# Patient Record
Sex: MALE | Race: OTHER | URBAN - METROPOLITAN AREA
[De-identification: names, ages, dates, MRNs, and addresses within clinical notes are randomized per-mention and may not be internally consistent; named-entity substitution may affect disease eponyms.]

---

## 2020-10-05 ENCOUNTER — INPATIENT (INPATIENT)
Facility: HOSPITAL | Age: 55
LOS: 1 days | Discharge: HOME | End: 2020-10-07
Attending: INTERNAL MEDICINE | Admitting: INTERNAL MEDICINE
Payer: MEDICAID

## 2020-10-05 VITALS
TEMPERATURE: 98 F | HEART RATE: 103 BPM | DIASTOLIC BLOOD PRESSURE: 65 MMHG | WEIGHT: 216.05 LBS | SYSTOLIC BLOOD PRESSURE: 100 MMHG | HEIGHT: 68 IN | OXYGEN SATURATION: 100 % | RESPIRATION RATE: 19 BRPM

## 2020-10-05 DIAGNOSIS — R56.9 UNSPECIFIED CONVULSIONS: ICD-10-CM

## 2020-10-05 DIAGNOSIS — R79.89 OTHER SPECIFIED ABNORMAL FINDINGS OF BLOOD CHEMISTRY: ICD-10-CM

## 2020-10-05 LAB
ALBUMIN SERPL ELPH-MCNC: 4.3 G/DL — SIGNIFICANT CHANGE UP (ref 3.3–5)
ALP SERPL-CCNC: 108 U/L — SIGNIFICANT CHANGE UP (ref 40–120)
ALT FLD-CCNC: 46 U/L — SIGNIFICANT CHANGE UP (ref 12–78)
ANION GAP SERPL CALC-SCNC: 15 MMOL/L — SIGNIFICANT CHANGE UP (ref 5–17)
APAP SERPL-MCNC: < 2 UG/ML (ref 10–30)
APPEARANCE UR: CLEAR — SIGNIFICANT CHANGE UP
APTT BLD: 26.3 SEC — LOW (ref 27.5–35.5)
AST SERPL-CCNC: 45 U/L — HIGH (ref 15–37)
BASOPHILS # BLD AUTO: 0.03 K/UL — SIGNIFICANT CHANGE UP (ref 0–0.2)
BASOPHILS NFR BLD AUTO: 0.5 % — SIGNIFICANT CHANGE UP (ref 0–2)
BILIRUB SERPL-MCNC: 0.8 MG/DL — SIGNIFICANT CHANGE UP (ref 0.2–1.2)
BILIRUB UR-MCNC: NEGATIVE — SIGNIFICANT CHANGE UP
BUN SERPL-MCNC: 13 MG/DL — SIGNIFICANT CHANGE UP (ref 7–23)
CALCIUM SERPL-MCNC: 8.5 MG/DL — SIGNIFICANT CHANGE UP (ref 8.5–10.1)
CHLORIDE SERPL-SCNC: 107 MMOL/L — SIGNIFICANT CHANGE UP (ref 96–108)
CO2 SERPL-SCNC: 17 MMOL/L — LOW (ref 22–31)
COLOR SPEC: YELLOW — SIGNIFICANT CHANGE UP
COMMENT - URINE: SIGNIFICANT CHANGE UP
CREAT SERPL-MCNC: 1.32 MG/DL — HIGH (ref 0.5–1.3)
DIFF PNL FLD: NEGATIVE — SIGNIFICANT CHANGE UP
EOSINOPHIL # BLD AUTO: 0.13 K/UL — SIGNIFICANT CHANGE UP (ref 0–0.5)
EOSINOPHIL NFR BLD AUTO: 2.1 % — SIGNIFICANT CHANGE UP (ref 0–6)
ETHANOL SERPL-MCNC: <10 MG/DL — SIGNIFICANT CHANGE UP (ref 0–10)
GLUCOSE BLDC GLUCOMTR-MCNC: 117 MG/DL — HIGH (ref 70–99)
GLUCOSE SERPL-MCNC: 116 MG/DL — HIGH (ref 70–99)
GLUCOSE UR QL: NEGATIVE MG/DL — SIGNIFICANT CHANGE UP
GRAN CASTS # UR COMP ASSIST: ABNORMAL /LPF
HCT VFR BLD CALC: 44.8 % — SIGNIFICANT CHANGE UP (ref 39–50)
HGB BLD-MCNC: 15.3 G/DL — SIGNIFICANT CHANGE UP (ref 13–17)
IMM GRANULOCYTES NFR BLD AUTO: 0.3 % — SIGNIFICANT CHANGE UP (ref 0–1.5)
INR BLD: 1.05 RATIO — SIGNIFICANT CHANGE UP (ref 0.88–1.16)
KETONES UR-MCNC: NEGATIVE — SIGNIFICANT CHANGE UP
LACTATE SERPL-SCNC: 1.6 MMOL/L — SIGNIFICANT CHANGE UP (ref 0.7–2)
LACTATE SERPL-SCNC: 10.4 MMOL/L — CRITICAL HIGH (ref 0.7–2)
LEUKOCYTE ESTERASE UR-ACNC: NEGATIVE — SIGNIFICANT CHANGE UP
LYMPHOCYTES # BLD AUTO: 1.81 K/UL — SIGNIFICANT CHANGE UP (ref 1–3.3)
LYMPHOCYTES # BLD AUTO: 29.9 % — SIGNIFICANT CHANGE UP (ref 13–44)
MCHC RBC-ENTMCNC: 31.1 PG — SIGNIFICANT CHANGE UP (ref 27–34)
MCHC RBC-ENTMCNC: 34.2 GM/DL — SIGNIFICANT CHANGE UP (ref 32–36)
MCV RBC AUTO: 91.1 FL — SIGNIFICANT CHANGE UP (ref 80–100)
MONOCYTES # BLD AUTO: 0.44 K/UL — SIGNIFICANT CHANGE UP (ref 0–0.9)
MONOCYTES NFR BLD AUTO: 7.3 % — SIGNIFICANT CHANGE UP (ref 2–14)
NEUTROPHILS # BLD AUTO: 3.62 K/UL — SIGNIFICANT CHANGE UP (ref 1.8–7.4)
NEUTROPHILS NFR BLD AUTO: 59.9 % — SIGNIFICANT CHANGE UP (ref 43–77)
NITRITE UR-MCNC: NEGATIVE — SIGNIFICANT CHANGE UP
NRBC # BLD: 0 /100 WBCS — SIGNIFICANT CHANGE UP (ref 0–0)
PH UR: 6 — SIGNIFICANT CHANGE UP (ref 5–8)
PLATELET # BLD AUTO: 255 K/UL — SIGNIFICANT CHANGE UP (ref 150–400)
POTASSIUM SERPL-MCNC: 3.8 MMOL/L — SIGNIFICANT CHANGE UP (ref 3.5–5.3)
POTASSIUM SERPL-SCNC: 3.8 MMOL/L — SIGNIFICANT CHANGE UP (ref 3.5–5.3)
PROT SERPL-MCNC: 7.8 GM/DL — SIGNIFICANT CHANGE UP (ref 6–8.3)
PROT UR-MCNC: 30 MG/DL
PROTHROM AB SERPL-ACNC: 12.2 SEC — SIGNIFICANT CHANGE UP (ref 10.6–13.6)
RBC # BLD: 4.92 M/UL — SIGNIFICANT CHANGE UP (ref 4.2–5.8)
RBC # FLD: 12.8 % — SIGNIFICANT CHANGE UP (ref 10.3–14.5)
RBC CASTS # UR COMP ASSIST: SIGNIFICANT CHANGE UP /HPF (ref 0–4)
SALICYLATES SERPL-MCNC: <1.7 MG/DL — LOW (ref 2.8–20)
SODIUM SERPL-SCNC: 139 MMOL/L — SIGNIFICANT CHANGE UP (ref 135–145)
SP GR SPEC: 1.01 — SIGNIFICANT CHANGE UP (ref 1.01–1.02)
TROPONIN I SERPL-MCNC: <.015 NG/ML — SIGNIFICANT CHANGE UP (ref 0.01–0.04)
UROBILINOGEN FLD QL: NEGATIVE MG/DL — SIGNIFICANT CHANGE UP
WBC # BLD: 6.05 K/UL — SIGNIFICANT CHANGE UP (ref 3.8–10.5)
WBC # FLD AUTO: 6.05 K/UL — SIGNIFICANT CHANGE UP (ref 3.8–10.5)
WBC UR QL: SIGNIFICANT CHANGE UP

## 2020-10-05 PROCEDURE — 70450 CT HEAD/BRAIN W/O DYE: CPT | Mod: 26

## 2020-10-05 PROCEDURE — 70496 CT ANGIOGRAPHY HEAD: CPT | Mod: 26

## 2020-10-05 PROCEDURE — 71045 X-RAY EXAM CHEST 1 VIEW: CPT | Mod: 26

## 2020-10-05 PROCEDURE — 99223 1ST HOSP IP/OBS HIGH 75: CPT

## 2020-10-05 PROCEDURE — 70498 CT ANGIOGRAPHY NECK: CPT | Mod: 26

## 2020-10-05 PROCEDURE — 93010 ELECTROCARDIOGRAM REPORT: CPT

## 2020-10-05 PROCEDURE — 99285 EMERGENCY DEPT VISIT HI MDM: CPT

## 2020-10-05 RX ORDER — LEVETIRACETAM 250 MG/1
500 TABLET, FILM COATED ORAL
Refills: 0 | Status: DISCONTINUED | OUTPATIENT
Start: 2020-10-05 | End: 2020-10-07

## 2020-10-05 RX ORDER — LEVETIRACETAM 250 MG/1
1000 TABLET, FILM COATED ORAL ONCE
Refills: 0 | Status: COMPLETED | OUTPATIENT
Start: 2020-10-05 | End: 2020-10-05

## 2020-10-05 RX ORDER — SODIUM CHLORIDE 9 MG/ML
1000 INJECTION INTRAMUSCULAR; INTRAVENOUS; SUBCUTANEOUS ONCE
Refills: 0 | Status: COMPLETED | OUTPATIENT
Start: 2020-10-05 | End: 2020-10-05

## 2020-10-05 RX ORDER — ASPIRIN/CALCIUM CARB/MAGNESIUM 324 MG
81 TABLET ORAL DAILY
Refills: 0 | Status: DISCONTINUED | OUTPATIENT
Start: 2020-10-05 | End: 2020-10-07

## 2020-10-05 RX ADMIN — LEVETIRACETAM 500 MILLIGRAM(S): 250 TABLET, FILM COATED ORAL at 17:51

## 2020-10-05 RX ADMIN — LEVETIRACETAM 1000 MILLIGRAM(S): 250 TABLET, FILM COATED ORAL at 10:37

## 2020-10-05 RX ADMIN — SODIUM CHLORIDE 1000 MILLILITER(S): 9 INJECTION INTRAMUSCULAR; INTRAVENOUS; SUBCUTANEOUS at 09:20

## 2020-10-05 RX ADMIN — Medication 81 MILLIGRAM(S): at 16:56

## 2020-10-05 NOTE — ED ADULT NURSE NOTE - NSIMPLEMENTINTERV_GEN_ALL_ED
Implemented All Fall Risk Interventions:  [place] to call system. Call bell, personal items and telephone within reach. Instruct patient to call for assistance. Room bathroom lighting operational. Non-slip footwear when patient is off stretcher. Physically safe environment: no spills, clutter or unnecessary equipment. Stretcher in lowest position, wheels locked, appropriate side rails in place. Provide visual cue, wrist band, yellow gown, etc. Monitor gait and stability. Monitor for mental status changes and reorient to person, place, and time. Review medications for side effects contributing to fall risk. Reinforce activity limits and safety measures with patient and family.

## 2020-10-05 NOTE — CONSULT NOTE ADULT - ASSESSMENT
Breakthrough seizure likely secondary to old L MCA CVA  Right sided weakness resolved likely secondary to post ictal state- resolved  NIHSS-2  MRS 0 Breakthrough seizure likely secondary to old L MCA CVA  Right sided weakness resolved likely secondary to post ictal state- resolved  NIHSS-2  MRS 0    Plan  Load Keppra 2gm  Continue Keppra 500mg BID  EEG  medication reconciliation  ASA/Statin for stroke prevention for now  lipid, A1C   Seizure precautions  PT/OT/ST  Will continue to follow

## 2020-10-05 NOTE — ED PROVIDER NOTE - PROGRESS NOTE DETAILS
Radha: pt now waking up, appears post ictal, difficulty moving R side of body and difficulty speaking, suspect post-ictal c/s neurology for recs Radha: spoke with Dr. Dorsey (neurology) who recommends calling as code stroke. Symtpoms now resolving, alert and oriented to self and place, strength improved on R side.

## 2020-10-05 NOTE — ED PROVIDER NOTE - PHYSICAL EXAMINATION
VITALS: reviewed  GEN: NAD  HEAD/EYES: NCAT, PERRL, R gaze preference, EOMI, anicteric sclerae, no conjunctival pallor  ENT: mucus membranes moist, oropharynx WNL, trachea midline, no JVD  RESP: lungs CTA with equal breath sounds bilaterally,  CV: heart with reg rhythm S1, S2, no murmur; distal pulses intact and symmetric bilaterally  ABDOMEN: , soft, nondistended,   MSK: extremities atraumatic and nontender, no edema, no asymmetry.  SKIN: warm, dry, no rash, no bruising, no cyanosis. color appropriate for ethnicity  NEURO: no facial asymmetry. sedated  PSYCH: Affect appropriate

## 2020-10-05 NOTE — CONSULT NOTE ADULT - NUTRITIONAL ASSESSMENT
Load Keppra 2gm  Continue Keppra 500mg BID  EEG  medication reconciliation  ASA/Statin for stroke prevention for now  lipid, A1C   Seizure precautions  PT/OT/ST  Will continue to follow

## 2020-10-05 NOTE — ED ADULT TRIAGE NOTE - CHIEF COMPLAINT QUOTE
patient BIBA , patient sedated, as per EMS patient had a seizure got Versed 10 mg IM , patient directed to bed 1 Md and nurses by bed side

## 2020-10-05 NOTE — H&P ADULT - ASSESSMENT
55 yold male with history of seizures had a seizure on kepra here       IMPROVE VTE Individual Risk Assessment    RISK                                                          Points  [] Previous VTE                                           3  [] Thrombophilia                                        2  [] Lower limb paralysis                              2   [] Current Cancer                                       2   [] Immobilization > 24 hrs                        1  [] ICU/CCU stay > 24 hours                       1  [] Age > 60                                                   1    IMPROVE VTE Score:0

## 2020-10-05 NOTE — H&P ADULT - HISTORY OF PRESENT ILLNESS
: 54 yo M hx DM2, seizures, BIBA s/p witnessed seizure at work. 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 Per EMS, episode lasted about 10 minutes with eyes noted to be deviated to R. Patient last seizure was three months ago and he had his first seizure two years ago

## 2020-10-05 NOTE — ED PROVIDER NOTE - OBJECTIVE STATEMENT
56 yo M hx DM2, seizures, BIBA s/p witnessed seizure at work. 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 Per EMS, episode lasted about 10 minutes with eyes noted to be deviated to R. Unknown when last seizure was or what meds he was on. Pt was given 10 mg IM Versed on field.

## 2020-10-05 NOTE — ED ADULT NURSE NOTE - OBJECTIVE STATEMENT
Pt received in post ictal state w R 18g . Pt brought in by EMS after having a 10 min witnessed seizure at work. Pt given 10mg versed in route to ED. Pt has a history of seizure.

## 2020-10-05 NOTE — CONSULT NOTE ADULT - SUBJECTIVE AND OBJECTIVE BOX
56 yo M hx DM2, Old L MCA CVA BIBA s/p witnessed seizure at work. 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 Per EMS, episode lasted about 10 minutes with eyes noted to be deviated to R. Unknown when last seizure was or what meds he was on. Pt was given 10 mg IM Versed on field. Pt continued to have right sided weakness and stroke code was called but improved. CT/CTA done showing no acute findings.     Pt still a little confused but denied any past seizure history and seizure medications.

## 2020-10-05 NOTE — H&P ADULT - NSHPPHYSICALEXAM_GEN_ALL_CORE
ICU Vital Signs Last 24 Hrs  T(C): 36.5 (05 Oct 2020 11:44), Max: 37.3 (05 Oct 2020 09:15)  T(F): 97.7 (05 Oct 2020 11:44), Max: 99.2 (05 Oct 2020 09:15)  HR: 85 (05 Oct 2020 11:44) (85 - 103)  BP: 124/77 (05 Oct 2020 11:44) (100/65 - 137/87)  BP(mean): --  ABP: --  ABP(mean): --  RR: 16 (05 Oct 2020 11:44) (14 - 23)  SpO2: 98% (05 Oct 2020 11:44) (91% - 100%)  GENERAL: NAD well-developed  HEAD:  Atraumatic, Normocephalic  EYES: EOMI, PERRLA, conjunctiva and sclera clear  ENMT: No tonsillar erythema, exudates, or enlargement; Moist mucous membranes, Good dentition, No lesions  NECK: Supple, No JVD, Normal thyroid  NERVOUS SYSTEM:  Alert & Oriented X3, Good concentration; Motor Strength 5/5 B/L upper and lower extremities; DTRs 2+ intact and symmetric  CHEST/LUNG: Clear to percussion bilaterally; No rales, rhonchi, wheezing, or rubs  HEART: Regular rate and rhythm; No murmurs, rubs, or gallops  ABDOMEN: Soft, Nontender, Nondistended; Bowel sounds present  EXTREMITIES:  2+ Peripheral Pulses, No clubbing, cyanosis, or edema  LYMPH: No lymphadenopathy   SKIN: No rashes or lesions

## 2020-10-05 NOTE — CONSULT NOTE ADULT - NSREFPHYEXINPTDOCREFER_GEN_ALL_CORE
AAOX2-3. CN2-12 intact. right spasticity in upper and lower extremity. otherwise BOB. sensation intact b/l to LT. reflexes +3 on right. gait deferred

## 2020-10-05 NOTE — ED PROVIDER NOTE - CLINICAL SUMMARY MEDICAL DECISION MAKING FREE TEXT BOX
56 yo M presenting s/p witnessed seizure episode, post-ictal, will obtain CT-H, labs, EKG, neuro c/s, tba

## 2020-10-06 LAB
ANION GAP SERPL CALC-SCNC: 4 MMOL/L — LOW (ref 5–17)
BUN SERPL-MCNC: 12 MG/DL — SIGNIFICANT CHANGE UP (ref 7–23)
CALCIUM SERPL-MCNC: 8.5 MG/DL — SIGNIFICANT CHANGE UP (ref 8.5–10.1)
CHLORIDE SERPL-SCNC: 108 MMOL/L — SIGNIFICANT CHANGE UP (ref 96–108)
CHOLEST SERPL-MCNC: 107 MG/DL — SIGNIFICANT CHANGE UP (ref 10–199)
CO2 SERPL-SCNC: 27 MMOL/L — SIGNIFICANT CHANGE UP (ref 22–31)
CREAT SERPL-MCNC: 0.7 MG/DL — SIGNIFICANT CHANGE UP (ref 0.5–1.3)
CULTURE RESULTS: SIGNIFICANT CHANGE UP
GLUCOSE BLDC GLUCOMTR-MCNC: 119 MG/DL — HIGH (ref 70–99)
GLUCOSE BLDC GLUCOMTR-MCNC: 136 MG/DL — HIGH (ref 70–99)
GLUCOSE SERPL-MCNC: 115 MG/DL — HIGH (ref 70–99)
HCT VFR BLD CALC: 41.5 % — SIGNIFICANT CHANGE UP (ref 39–50)
HDLC SERPL-MCNC: 55 MG/DL — SIGNIFICANT CHANGE UP
HGB BLD-MCNC: 14.4 G/DL — SIGNIFICANT CHANGE UP (ref 13–17)
LIPID PNL WITH DIRECT LDL SERPL: 39 MG/DL — SIGNIFICANT CHANGE UP
MCHC RBC-ENTMCNC: 31.2 PG — SIGNIFICANT CHANGE UP (ref 27–34)
MCHC RBC-ENTMCNC: 34.7 GM/DL — SIGNIFICANT CHANGE UP (ref 32–36)
MCV RBC AUTO: 90 FL — SIGNIFICANT CHANGE UP (ref 80–100)
NRBC # BLD: 0 /100 WBCS — SIGNIFICANT CHANGE UP (ref 0–0)
PLATELET # BLD AUTO: 230 K/UL — SIGNIFICANT CHANGE UP (ref 150–400)
POTASSIUM SERPL-MCNC: 4 MMOL/L — SIGNIFICANT CHANGE UP (ref 3.5–5.3)
POTASSIUM SERPL-SCNC: 4 MMOL/L — SIGNIFICANT CHANGE UP (ref 3.5–5.3)
RBC # BLD: 4.61 M/UL — SIGNIFICANT CHANGE UP (ref 4.2–5.8)
RBC # FLD: 12.9 % — SIGNIFICANT CHANGE UP (ref 10.3–14.5)
SARS-COV-2 IGG SERPL QL IA: POSITIVE
SARS-COV-2 IGM SERPL IA-ACNC: 32.6 INDEX — HIGH
SARS-COV-2 RNA SPEC QL NAA+PROBE: SIGNIFICANT CHANGE UP
SODIUM SERPL-SCNC: 139 MMOL/L — SIGNIFICANT CHANGE UP (ref 135–145)
SPECIMEN SOURCE: SIGNIFICANT CHANGE UP
TOTAL CHOLESTEROL/HDL RATIO MEASUREMENT: 1.9 RATIO — LOW (ref 3.4–9.6)
TRIGL SERPL-MCNC: 65 MG/DL — SIGNIFICANT CHANGE UP (ref 10–149)
WBC # BLD: 4.91 K/UL — SIGNIFICANT CHANGE UP (ref 3.8–10.5)
WBC # FLD AUTO: 4.91 K/UL — SIGNIFICANT CHANGE UP (ref 3.8–10.5)

## 2020-10-06 PROCEDURE — 99233 SBSQ HOSP IP/OBS HIGH 50: CPT

## 2020-10-06 RX ORDER — GLUCAGON INJECTION, SOLUTION 0.5 MG/.1ML
1 INJECTION, SOLUTION SUBCUTANEOUS ONCE
Refills: 0 | Status: DISCONTINUED | OUTPATIENT
Start: 2020-10-06 | End: 2020-10-07

## 2020-10-06 RX ORDER — DEXTROSE 50 % IN WATER 50 %
12.5 SYRINGE (ML) INTRAVENOUS ONCE
Refills: 0 | Status: DISCONTINUED | OUTPATIENT
Start: 2020-10-06 | End: 2020-10-07

## 2020-10-06 RX ORDER — METOPROLOL TARTRATE 50 MG
25 TABLET ORAL EVERY 12 HOURS
Refills: 0 | Status: DISCONTINUED | OUTPATIENT
Start: 2020-10-06 | End: 2020-10-07

## 2020-10-06 RX ORDER — INSULIN LISPRO 100/ML
VIAL (ML) SUBCUTANEOUS
Refills: 0 | Status: DISCONTINUED | OUTPATIENT
Start: 2020-10-06 | End: 2020-10-07

## 2020-10-06 RX ORDER — ENOXAPARIN SODIUM 100 MG/ML
40 INJECTION SUBCUTANEOUS DAILY
Refills: 0 | Status: DISCONTINUED | OUTPATIENT
Start: 2020-10-06 | End: 2020-10-07

## 2020-10-06 RX ORDER — DEXTROSE 50 % IN WATER 50 %
25 SYRINGE (ML) INTRAVENOUS ONCE
Refills: 0 | Status: DISCONTINUED | OUTPATIENT
Start: 2020-10-06 | End: 2020-10-07

## 2020-10-06 RX ORDER — ATORVASTATIN CALCIUM 80 MG/1
40 TABLET, FILM COATED ORAL AT BEDTIME
Refills: 0 | Status: DISCONTINUED | OUTPATIENT
Start: 2020-10-06 | End: 2020-10-07

## 2020-10-06 RX ORDER — DEXTROSE 50 % IN WATER 50 %
15 SYRINGE (ML) INTRAVENOUS ONCE
Refills: 0 | Status: DISCONTINUED | OUTPATIENT
Start: 2020-10-06 | End: 2020-10-07

## 2020-10-06 RX ORDER — AMLODIPINE BESYLATE 2.5 MG/1
10 TABLET ORAL DAILY
Refills: 0 | Status: DISCONTINUED | OUTPATIENT
Start: 2020-10-06 | End: 2020-10-07

## 2020-10-06 RX ORDER — SODIUM CHLORIDE 9 MG/ML
1000 INJECTION, SOLUTION INTRAVENOUS
Refills: 0 | Status: DISCONTINUED | OUTPATIENT
Start: 2020-10-06 | End: 2020-10-07

## 2020-10-06 RX ADMIN — Medication 81 MILLIGRAM(S): at 11:53

## 2020-10-06 RX ADMIN — LEVETIRACETAM 500 MILLIGRAM(S): 250 TABLET, FILM COATED ORAL at 05:38

## 2020-10-06 RX ADMIN — Medication 25 MILLIGRAM(S): at 17:27

## 2020-10-06 RX ADMIN — LEVETIRACETAM 500 MILLIGRAM(S): 250 TABLET, FILM COATED ORAL at 17:27

## 2020-10-06 RX ADMIN — ATORVASTATIN CALCIUM 40 MILLIGRAM(S): 80 TABLET, FILM COATED ORAL at 21:12

## 2020-10-06 NOTE — PROGRESS NOTE ADULT - SUBJECTIVE AND OBJECTIVE BOX
Patient is a 55y old  Male who presents with a chief complaint of xseizure (06 Oct 2020 11:59)      INTERVAL HPI/OVERNIGHT EVENTS: Pt doing well,     MEDICATIONS  (STANDING):  amLODIPine   Tablet 10 milliGRAM(s) Oral daily  aspirin enteric coated 81 milliGRAM(s) Oral daily  atorvastatin 40 milliGRAM(s) Oral at bedtime  dextrose 5%. 1000 milliLiter(s) (50 mL/Hr) IV Continuous <Continuous>  dextrose 50% Injectable 12.5 Gram(s) IV Push once  dextrose 50% Injectable 25 Gram(s) IV Push once  dextrose 50% Injectable 25 Gram(s) IV Push once  insulin lispro (HumaLOG) corrective regimen sliding scale   SubCutaneous three times a day before meals  levETIRAcetam 500 milliGRAM(s) Oral two times a day  metoprolol tartrate 25 milliGRAM(s) Oral every 12 hours    MEDICATIONS  (PRN):  dextrose 40% Gel 15 Gram(s) Oral once PRN Blood Glucose LESS THAN 70 milliGRAM(s)/deciliter  glucagon  Injectable 1 milliGRAM(s) IntraMuscular once PRN Glucose LESS THAN 70 milligrams/deciliter      Allergies    Allergy Status Unknown    Intolerances        REVIEW OF SYSTEMS:  CONSTITUTIONAL: No fever, weight loss, or fatigue  EYES: No eye pain, visual disturbances, or discharge  ENMT:  No difficulty hearing, tinnitus, vertigo; No sinus or throat pain  NECK: No pain or stiffness  BREASTS: No pain, masses, or nipple discharge  RESPIRATORY: No cough, wheezing, chills or hemoptysis; No shortness of breath  CARDIOVASCULAR: No chest pain, palpitations, dizziness, or leg swelling  GASTROINTESTINAL: No abdominal or epigastric pain. No nausea, vomiting, or hematemesis; No diarrhea or constipation. No melena or hematochezia.  GENITOURINARY: No dysuria, frequency, hematuria, or incontinence  NEUROLOGICAL: No headaches, memory loss, loss of strength, numbness, or tremors  SKIN: No itching, burning, rashes, or lesions   LYMPH NODES: No enlarged glands  ENDOCRINE: No heat or cold intolerance; No hair loss  MUSCULOSKELETAL: No joint pain or swelling; No muscle, back, or extremity pain  PSYCHIATRIC: No depression, anxiety, mood swings, or difficulty sleeping  HEME/LYMPH: No easy bruising, or bleeding gums  ALLERGY AND IMMUNOLOGIC: No hives or eczema    Vital Signs Last 24 Hrs  T(C): 36.9 (06 Oct 2020 09:37), Max: 37 (05 Oct 2020 18:26)  T(F): 98.5 (06 Oct 2020 09:37), Max: 98.6 (05 Oct 2020 18:26)  HR: 84 (06 Oct 2020 09:37) (66 - 84)  BP: 128/80 (06 Oct 2020 09:37) (105/70 - 128/80)  BP(mean): --  RR: 18 (06 Oct 2020 09:37) (15 - 18)  SpO2: 96% (06 Oct 2020 09:37) (95% - 97%)    PHYSICAL EXAM:  GENERAL: NAD, well-groomed, well-developed  HEAD:  Atraumatic, Normocephalic  EYES: EOMI, PERRLA, conjunctiva and sclera clear  ENMT: No tonsillar erythema, exudates, or enlargement; Moist mucous membranes, Good dentition, No lesions  NECK: Supple, No JVD, Normal thyroid  NERVOUS SYSTEM:  Alert & Oriented X3, Good concentration; Motor Strength 5/5 B/L upper and lower extremities; DTRs 2+ intact and symmetric  CHEST/LUNG: Clear to percussion bilaterally; No rales, rhonchi, wheezing, or rubs  HEART: Regular rate and rhythm; No murmurs, rubs, or gallops  ABDOMEN: Soft, Nontender, Nondistended; Bowel sounds present  EXTREMITIES:  2+ Peripheral Pulses, No clubbing, cyanosis, or edema  LYMPH: No lymphadenopathy noted  SKIN: No rashes or lesions    LABS:                        14.4   4.91  )-----------( 230      ( 06 Oct 2020 08:11 )             41.5     10-06    139  |  108  |  12  ----------------------------<  115<H>  4.0   |  27  |  0.70    Ca    8.5      06 Oct 2020 08:11    TPro  7.8  /  Alb  4.3  /  TBili  0.8  /  DBili  x   /  AST  45<H>  /  ALT  46  /  AlkPhos  108  10-05    PT/INR - ( 05 Oct 2020 08:59 )   PT: 12.2 sec;   INR: 1.05 ratio         PTT - ( 05 Oct 2020 08:59 )  PTT:26.3 sec  Urinalysis Basic - ( 05 Oct 2020 11:11 )    Color: Yellow / Appearance: Clear / S.010 / pH: x  Gluc: x / Ketone: Negative  / Bili: Negative / Urobili: Negative mg/dL   Blood: x / Protein: 30 mg/dL / Nitrite: Negative   Leuk Esterase: Negative / RBC: 0-2 /HPF / WBC 0-2   Sq Epi: x / Non Sq Epi: x / Bacteria: x      CAPILLARY BLOOD GLUCOSE      POCT Blood Glucose.: 136 mg/dL (06 Oct 2020 15:47)      RADIOLOGY & ADDITIONAL TESTS:    Imaging Personally Reviewed:  [ ] YES  [ ] NO    Consultant(s) Notes Reviewed:  [ ] YES  [ ] NO    Care Discussed with Consultants/Other Providers [ ] YES  [ ] NO Patient is a 55y old  Male who presents with a chief complaint of seizure (06 Oct 2020 11:59)      INTERVAL HPI/OVERNIGHT EVENTS: Pt doing well, no complaints at bedside today. Wife at bedside.   Patient states this was his first time ever having a seizure episode. As per wife only takes medications for depression and cholesterol at home.     MEDICATIONS  (STANDING):  amLODIPine   Tablet 10 milliGRAM(s) Oral daily  aspirin enteric coated 81 milliGRAM(s) Oral daily  atorvastatin 40 milliGRAM(s) Oral at bedtime  dextrose 5%. 1000 milliLiter(s) (50 mL/Hr) IV Continuous <Continuous>  dextrose 50% Injectable 12.5 Gram(s) IV Push once  dextrose 50% Injectable 25 Gram(s) IV Push once  dextrose 50% Injectable 25 Gram(s) IV Push once  insulin lispro (HumaLOG) corrective regimen sliding scale   SubCutaneous three times a day before meals  levETIRAcetam 500 milliGRAM(s) Oral two times a day  metoprolol tartrate 25 milliGRAM(s) Oral every 12 hours    MEDICATIONS  (PRN):  dextrose 40% Gel 15 Gram(s) Oral once PRN Blood Glucose LESS THAN 70 milliGRAM(s)/deciliter  glucagon  Injectable 1 milliGRAM(s) IntraMuscular once PRN Glucose LESS THAN 70 milligrams/deciliter      Allergies    Allergy Status Unknown    Intolerances        REVIEW OF SYSTEMS:  CONSTITUTIONAL: No fever, weight loss, or fatigue  EYES: No eye pain, visual disturbances, or discharge  ENMT:  No difficulty hearing, tinnitus, vertigo; No sinus or throat pain  NECK: No pain or stiffness  BREASTS: No pain, masses, or nipple discharge  RESPIRATORY: No cough, wheezing, chills or hemoptysis; No shortness of breath  CARDIOVASCULAR: No chest pain, palpitations, dizziness, or leg swelling  GASTROINTESTINAL: No abdominal or epigastric pain. No nausea, vomiting, or hematemesis; No diarrhea or constipation. No melena or hematochezia.  GENITOURINARY: No dysuria, frequency, hematuria, or incontinence  NEUROLOGICAL: No headaches, memory loss, loss of strength, numbness, or tremors  SKIN: No itching, burning, rashes, or lesions   LYMPH NODES: No enlarged glands  ENDOCRINE: No heat or cold intolerance; No hair loss  MUSCULOSKELETAL: No joint pain or swelling; No muscle, back, or extremity pain  PSYCHIATRIC: No depression, anxiety, mood swings, or difficulty sleeping  HEME/LYMPH: No easy bruising, or bleeding gums  ALLERGY AND IMMUNOLOGIC: No hives or eczema    Vital Signs Last 24 Hrs  T(C): 36.9 (06 Oct 2020 09:37), Max: 37 (05 Oct 2020 18:26)  T(F): 98.5 (06 Oct 2020 09:37), Max: 98.6 (05 Oct 2020 18:26)  HR: 84 (06 Oct 2020 09:37) (66 - 84)  BP: 128/80 (06 Oct 2020 09:37) (105/70 - 128/80)  BP(mean): --  RR: 18 (06 Oct 2020 09:37) (15 - 18)  SpO2: 96% (06 Oct 2020 09:37) (95% - 97%)    PHYSICAL EXAM:  GENERAL: NAD, well-groomed, well-developed  HEAD:  Atraumatic, Normocephalic  EYES: EOMI, PERRLA, conjunctiva and sclera clear  ENMT: No tonsillar erythema, exudates, or enlargement; Moist mucous membranes, Good dentition, No lesions  NECK: Supple, No JVD, Normal thyroid  NERVOUS SYSTEM:  Alert & Oriented X3, Good concentration; right sided weakness s/p old stroke (chronic)  CHEST/LUNG: Clear to percussion bilaterally; No rales, rhonchi, wheezing, or rubs  HEART: Regular rate and rhythm; No murmurs, rubs, or gallops  ABDOMEN: Soft, Nontender, Nondistended; Bowel sounds present  EXTREMITIES:  2+ Peripheral Pulses, No clubbing, cyanosis, or edema  LYMPH: No lymphadenopathy noted  SKIN: No rashes or lesions    LABS:                        14.4   4.91  )-----------( 230      ( 06 Oct 2020 08:11 )             41.5     10-06    139  |  108  |  12  ----------------------------<  115<H>  4.0   |  27  |  0.70    Ca    8.5      06 Oct 2020 08:11    TPro  7.8  /  Alb  4.3  /  TBili  0.8  /  DBili  x   /  AST  45<H>  /  ALT  46  /  AlkPhos  108  10-05    PT/INR - ( 05 Oct 2020 08:59 )   PT: 12.2 sec;   INR: 1.05 ratio         PTT - ( 05 Oct 2020 08:59 )  PTT:26.3 sec  Urinalysis Basic - ( 05 Oct 2020 11:11 )    Color: Yellow / Appearance: Clear / S.010 / pH: x  Gluc: x / Ketone: Negative  / Bili: Negative / Urobili: Negative mg/dL   Blood: x / Protein: 30 mg/dL / Nitrite: Negative   Leuk Esterase: Negative / RBC: 0-2 /HPF / WBC 0-2   Sq Epi: x / Non Sq Epi: x / Bacteria: x      CAPILLARY BLOOD GLUCOSE      POCT Blood Glucose.: 136 mg/dL (06 Oct 2020 15:47)      RADIOLOGY & ADDITIONAL TESTS:    Imaging Personally Reviewed:  [ ] YES  [ ] NO    Consultant(s) Notes Reviewed:  [ ] YES  [ ] NO    Care Discussed with Consultants/Other Providers [ ] YES  [ ] NO

## 2020-10-06 NOTE — PROGRESS NOTE ADULT - SUBJECTIVE AND OBJECTIVE BOX
Pt resting comfortably, answering questions appropriately. No seizures reported.     PE- AAOX2-3. CN2-12 intact. right spasticity in upper and lower extremity. otherwise BOB. sensation intact b/l to LT. reflexes +3 on right. gait deferred    LDL 39  CT old L MCA  CTA- neg  EEG pending    A/P  Breakthrough seizure likely secondary to old L MCA CVA  Seizure disorder    Plan  Continue Keppra 500mg BID  EEG pending  medication reconciliation  ASA/ LDL 39  Seizure precautions  PT/OT/ST  neuro stable, follow up outpt

## 2020-10-06 NOTE — PROGRESS NOTE ADULT - ASSESSMENT
56 yo M hx DM2, seizures, BIBA s/p witnessed seizure at work. 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 Per EMS, episode lasted about 10 minutes with eyes noted to be deviated to R. Patient last seizure was three months ago and he had his first seizure two years ago       Seizure  -   54 yo M hx DM2, seizures, BIBA s/p witnessed seizure at work. 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 Per EMS, episode lasted about 10 minutes with eyes noted to be deviated to R. Patient last seizure was three months ago and he had his first seizure two years ago       Seizure  -continue Keppra  -EEG  -Monitor electrolytes  -as per Neurology    HTN/HLD  -continue amlodipine  -continue atorvastatin    DM?  -HgA1C pending  -sliding scale    Hx of CVA  Large old left MCA infarct.      DVt ppx: Lovenox

## 2020-10-07 VITALS
TEMPERATURE: 99 F | OXYGEN SATURATION: 96 % | DIASTOLIC BLOOD PRESSURE: 82 MMHG | HEART RATE: 67 BPM | RESPIRATION RATE: 16 BRPM | SYSTOLIC BLOOD PRESSURE: 120 MMHG

## 2020-10-07 LAB
A1C WITH ESTIMATED AVERAGE GLUCOSE RESULT: 5.6 % — SIGNIFICANT CHANGE UP (ref 4–5.6)
ANION GAP SERPL CALC-SCNC: 6 MMOL/L — SIGNIFICANT CHANGE UP (ref 5–17)
BUN SERPL-MCNC: 10 MG/DL — SIGNIFICANT CHANGE UP (ref 7–23)
CALCIUM SERPL-MCNC: 8.7 MG/DL — SIGNIFICANT CHANGE UP (ref 8.5–10.1)
CHLORIDE SERPL-SCNC: 107 MMOL/L — SIGNIFICANT CHANGE UP (ref 96–108)
CO2 SERPL-SCNC: 24 MMOL/L — SIGNIFICANT CHANGE UP (ref 22–31)
CREAT SERPL-MCNC: 0.87 MG/DL — SIGNIFICANT CHANGE UP (ref 0.5–1.3)
ESTIMATED AVERAGE GLUCOSE: 114 MG/DL — SIGNIFICANT CHANGE UP (ref 68–114)
GLUCOSE BLDC GLUCOMTR-MCNC: 106 MG/DL — HIGH (ref 70–99)
GLUCOSE BLDC GLUCOMTR-MCNC: 127 MG/DL — HIGH (ref 70–99)
GLUCOSE SERPL-MCNC: 119 MG/DL — HIGH (ref 70–99)
HCT VFR BLD CALC: 44.9 % — SIGNIFICANT CHANGE UP (ref 39–50)
HGB BLD-MCNC: 15 G/DL — SIGNIFICANT CHANGE UP (ref 13–17)
MAGNESIUM SERPL-MCNC: 2.7 MG/DL — HIGH (ref 1.6–2.6)
MCHC RBC-ENTMCNC: 31 PG — SIGNIFICANT CHANGE UP (ref 27–34)
MCHC RBC-ENTMCNC: 33.4 GM/DL — SIGNIFICANT CHANGE UP (ref 32–36)
MCV RBC AUTO: 92.8 FL — SIGNIFICANT CHANGE UP (ref 80–100)
NRBC # BLD: 0 /100 WBCS — SIGNIFICANT CHANGE UP (ref 0–0)
PLATELET # BLD AUTO: 238 K/UL — SIGNIFICANT CHANGE UP (ref 150–400)
POTASSIUM SERPL-MCNC: 4 MMOL/L — SIGNIFICANT CHANGE UP (ref 3.5–5.3)
POTASSIUM SERPL-SCNC: 4 MMOL/L — SIGNIFICANT CHANGE UP (ref 3.5–5.3)
RBC # BLD: 4.84 M/UL — SIGNIFICANT CHANGE UP (ref 4.2–5.8)
RBC # FLD: 12.8 % — SIGNIFICANT CHANGE UP (ref 10.3–14.5)
SODIUM SERPL-SCNC: 137 MMOL/L — SIGNIFICANT CHANGE UP (ref 135–145)
WBC # BLD: 5.36 K/UL — SIGNIFICANT CHANGE UP (ref 3.8–10.5)
WBC # FLD AUTO: 5.36 K/UL — SIGNIFICANT CHANGE UP (ref 3.8–10.5)

## 2020-10-07 PROCEDURE — 99238 HOSP IP/OBS DSCHRG MGMT 30/<: CPT

## 2020-10-07 RX ORDER — AMLODIPINE BESYLATE 2.5 MG/1
1 TABLET ORAL
Qty: 30 | Refills: 0
Start: 2020-10-07 | End: 2020-11-05

## 2020-10-07 RX ORDER — METOPROLOL TARTRATE 50 MG
1 TABLET ORAL
Qty: 60 | Refills: 0
Start: 2020-10-07 | End: 2020-11-05

## 2020-10-07 RX ORDER — ATORVASTATIN CALCIUM 80 MG/1
1 TABLET, FILM COATED ORAL
Qty: 30 | Refills: 0
Start: 2020-10-07 | End: 2020-11-05

## 2020-10-07 RX ORDER — LEVETIRACETAM 250 MG/1
1 TABLET, FILM COATED ORAL
Qty: 60 | Refills: 0
Start: 2020-10-07 | End: 2020-11-05

## 2020-10-07 RX ORDER — AMLODIPINE BESYLATE 2.5 MG/1
1 TABLET ORAL
Qty: 0 | Refills: 0 | DISCHARGE

## 2020-10-07 RX ORDER — ATORVASTATIN CALCIUM 80 MG/1
1 TABLET, FILM COATED ORAL
Qty: 0 | Refills: 0 | DISCHARGE

## 2020-10-07 RX ORDER — ASPIRIN/CALCIUM CARB/MAGNESIUM 324 MG
1 TABLET ORAL
Qty: 30 | Refills: 0
Start: 2020-10-07 | End: 2020-11-05

## 2020-10-07 RX ORDER — METOPROLOL TARTRATE 50 MG
1 TABLET ORAL
Qty: 0 | Refills: 0 | DISCHARGE

## 2020-10-07 RX ADMIN — ENOXAPARIN SODIUM 40 MILLIGRAM(S): 100 INJECTION SUBCUTANEOUS at 11:22

## 2020-10-07 RX ADMIN — Medication 81 MILLIGRAM(S): at 11:22

## 2020-10-07 RX ADMIN — LEVETIRACETAM 500 MILLIGRAM(S): 250 TABLET, FILM COATED ORAL at 05:26

## 2020-10-07 RX ADMIN — AMLODIPINE BESYLATE 10 MILLIGRAM(S): 2.5 TABLET ORAL at 05:26

## 2020-10-07 NOTE — DISCHARGE NOTE PROVIDER - NSDCCPCAREPLAN_GEN_ALL_CORE_FT
PRINCIPAL DISCHARGE DIAGNOSIS  Diagnosis: Seizures  Assessment and Plan of Treatment:       SECONDARY DISCHARGE DIAGNOSES  Diagnosis: HLD (hyperlipidemia)  Assessment and Plan of Treatment:     Diagnosis: H/O: CVA (cerebrovascular accident)  Assessment and Plan of Treatment:     Diagnosis: Benign essential HTN  Assessment and Plan of Treatment:

## 2020-10-07 NOTE — PROGRESS NOTE ADULT - SUBJECTIVE AND OBJECTIVE BOX
Patient is a 55y old  Male who presents with a chief complaint of seizure (06 Oct 2020 11:59)      INTERVAL HPI/OVERNIGHT EVENTS: Pt doing well, no complaints at bedside today.   MEDICATIONS  (STANDING):  amLODIPine   Tablet 10 milliGRAM(s) Oral daily  aspirin enteric coated 81 milliGRAM(s) Oral daily  atorvastatin 40 milliGRAM(s) Oral at bedtime  dextrose 5%. 1000 milliLiter(s) (50 mL/Hr) IV Continuous <Continuous>  dextrose 50% Injectable 12.5 Gram(s) IV Push once  dextrose 50% Injectable 25 Gram(s) IV Push once  dextrose 50% Injectable 25 Gram(s) IV Push once  enoxaparin Injectable 40 milliGRAM(s) SubCutaneous daily  insulin lispro (HumaLOG) corrective regimen sliding scale   SubCutaneous three times a day before meals  levETIRAcetam 500 milliGRAM(s) Oral two times a day  metoprolol tartrate 25 milliGRAM(s) Oral every 12 hours    MEDICATIONS  (PRN):  dextrose 40% Gel 15 Gram(s) Oral once PRN Blood Glucose LESS THAN 70 milliGRAM(s)/deciliter  glucagon  Injectable 1 milliGRAM(s) IntraMuscular once PRN Glucose LESS THAN 70 milligrams/deciliter      Allergies    Allergy Status Unknown    Intolerances    Vital Signs Last 24 Hrs  T(C): 36.4 (07 Oct 2020 06:18), Max: 36.9 (06 Oct 2020 09:37)  T(F): 97.6 (07 Oct 2020 06:18), Max: 98.5 (06 Oct 2020 09:37)  HR: 53 (07 Oct 2020 06:18) (52 - 84)  BP: 107/67 (07 Oct 2020 06:18) (107/67 - 128/80)  BP(mean): --  RR: 16 (07 Oct 2020 06:18) (16 - 18)  SpO2: 97% (07 Oct 2020 06:18) (95% - 97%)  PHYSICAL EXAM:  GENERAL: NAD, well-groomed, well-developed  HEAD:  Atraumatic, Normocephalic  EYES: EOMI, PERRLA, conjunctiva and sclera clear  ENMT: No tonsillar erythema, exudates, or enlargement; Moist mucous membranes, Good dentition, No lesions  NECK: Supple, No JVD, Normal thyroid  NERVOUS SYSTEM:  Alert & Oriented X3, Good concentration; right sided weakness s/p old stroke (chronic)  CHEST/LUNG: Clear to percussion bilaterally; No rales, rhonchi, wheezing, or rubs  HEART: Regular rate and rhythm; No murmurs, rubs, or gallops  ABDOMEN: Soft, Nontender, Nondistended; Bowel sounds present  EXTREMITIES:  2+ Peripheral Pulses, No clubbing, cyanosis, or edema  SKIN: No rashes or lesions    LABS:                                   15.0   5.36  )-----------( 238      ( 07 Oct 2020 07:38 )             44.9   10-07    137  |  107  |  10  ----------------------------<  119<H>  4.0   |  24  |  0.87    Ca    8.7      07 Oct 2020 07:38  Mg     2.7     10-07    TPro  7.8  /  Alb  4.3  /  TBili  0.8  /  DBili  x   /  AST  45<H>  /  ALT  46  /  AlkPhos  108  10-05    CAPILLARY BLOOD GLUCOSE      POCT Blood Glucose.: 127 mg/dL (07 Oct 2020 07:29)  POCT Blood Glucose.: 119 mg/dL (06 Oct 2020 20:58)  POCT Blood Glucose.: 136 mg/dL (06 Oct 2020 15:47)      RADIOLOGY & ADDITIONAL TESTS:    Imaging Personally Reviewed:  [ ] YES  [ ] NO    Consultant(s) Notes Reviewed:  [ ] YES  [ ] NO    Care Discussed with Consultants/Other Providers [ ] YES  [ ] NO

## 2020-10-07 NOTE — DISCHARGE NOTE PROVIDER - CARE PROVIDER_API CALL
Juan Luis Dorsey (DO)  Neurology  [address] [place] [address]  Phone: [phone]  Fax: [phone]  Follow Up Time:

## 2020-10-07 NOTE — DISCHARGE NOTE PROVIDER - HOSPITAL COURSE
Assessment and Plan:   · Assessment	  56 yo M hx DM2, seizures, BIBA s/p witnessed seizure at work. 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 Per EMS, episode lasted about 10 minutes with eyes noted to be deviated to R. Patient last seizure was three months ago and he had his first seizure two years ago       Seizure  -continue Keppra  -EEG  -Monitor electrolytes  -as per Neurology   follow up with neurology     HTN/HLD  -continue amlodipine  -continue atorvastatin   lipid panel noted     DM?  -HgA1C pending  -sliding scale    Hx of CVA  Large old left MCA infarct.      DVt ppx: Lovenox    dispo need PT eval        Assessment and Plan:   · Assessment	  54 yo M hx DM2, seizures, BIBA s/p witnessed seizure at work. 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 Per EMS, episode lasted about 10 minutes with eyes noted to be deviated to R. Patient last seizure was three months ago and he had his first seizure two years ago       Seizure  -continue Keppra  -EEG  -Monitor electrolytes  -as per Neurology   follow up with neurology     HTN/HLD  -continue amlodipine  -continue atorvastatin   lipid panel noted     DM?  -HgA1C pending  -sliding scale    Hx of CVA  Large old left MCA infarct.      DVt ppx: Lovenox    dispo need PT eval      [id number]

## 2020-10-07 NOTE — DISCHARGE NOTE NURSING/CASE MANAGEMENT/SOCIAL WORK - PATIENT PORTAL LINK FT
You can access the FollowMyHealth Patient Portal offered by [facility] by registering at the following website: [email or web address]. By joining [facility]’s FollowMyHealth portal, you will also be able to view your health information using other applications (apps) compatible with our system.

## 2020-10-07 NOTE — PROGRESS NOTE ADULT - ASSESSMENT
56 yo M hx DM2, seizures, BIBA s/p witnessed seizure at work. 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 Per EMS, episode lasted about 10 minutes with eyes noted to be deviated to R. Patient last seizure was three months ago and he had his first seizure two years ago       Seizure  -continue Keppra  -EEG  -Monitor electrolytes  -as per Neurology    HTN/HLD  -continue amlodipine  -continue atorvastatin   lipid panel noted     DM?  -HgA1C pending  -sliding scale    Hx of CVA  Large old left MCA infarct.      DVt ppx: Lovenox    dispo need PT eval

## 2020-10-07 NOTE — PROGRESS NOTE ADULT - SUBJECTIVE AND OBJECTIVE BOX
Pt resting comfortably, answering questions appropriately. No seizures reported.     PE- AAOX2-3. CN2-12 intact. right spasticity in upper and lower extremity. otherwise BOB. sensation intact b/l to LT. reflexes +3 on right. gait deferred    LDL 39  CT old L MCA  CTA- neg  EEG L sided slowing, no seizures    A/P  Breakthrough seizure likely secondary to old L MCA CVA  Seizure disorder    Plan  Continue Keppra 500mg BID  medication reconciliation to confirm previous seizure medications  ASA/ LDL 39  neuro stable, follow up outpt

## 2020-10-07 NOTE — DISCHARGE NOTE PROVIDER - NSDCMRMEDTOKEN_GEN_ALL_CORE_FT
amLODIPine 10 mg oral tablet: 1 tab(s) orally once a day  aspirin 81 mg oral delayed release tablet: 1 tab(s) orally once a day  atorvastatin 40 mg oral tablet: 1 tab(s) orally once a day (at bedtime)  Keppra 500 mg oral tablet: 1 tab(s) orally 2 times a day   metoprolol tartrate 25 mg oral tablet: 1 tab(s) orally 2 times a day

## 2020-10-07 NOTE — DISCHARGE NOTE NURSING/CASE MANAGEMENT/SOCIAL WORK - NSDCPEPTSTRK_GEN_ALL_CORE
Risk factors for stroke/Stroke support groups for patients, families, and friends/Call 911 for stroke/Prescribed medications/Need for follow up after discharge/Stroke education booklet/Stroke warning signs and symptoms/Signs and symptoms of stroke

## 2020-10-10 LAB
CULTURE RESULTS: SIGNIFICANT CHANGE UP
CULTURE RESULTS: SIGNIFICANT CHANGE UP
SPECIMEN SOURCE: SIGNIFICANT CHANGE UP
SPECIMEN SOURCE: SIGNIFICANT CHANGE UP

## 2020-10-16 DIAGNOSIS — E78.5 HYPERLIPIDEMIA, UNSPECIFIED: ICD-10-CM

## 2020-10-16 DIAGNOSIS — G40.409 OTHER GENERALIZED EPILEPSY AND EPILEPTIC SYNDROMES, NOT INTRACTABLE, WITHOUT STATUS EPILEPTICUS: ICD-10-CM

## 2020-10-16 DIAGNOSIS — I10 ESSENTIAL (PRIMARY) HYPERTENSION: ICD-10-CM

## 2020-10-16 DIAGNOSIS — Z86.73 PERSONAL HISTORY OF TRANSIENT ISCHEMIC ATTACK (TIA), AND CEREBRAL INFARCTION WITHOUT RESIDUAL DEFICITS: ICD-10-CM

## 2020-10-16 DIAGNOSIS — E11.9 TYPE 2 DIABETES MELLITUS WITHOUT COMPLICATIONS: ICD-10-CM

## 2020-10-16 DIAGNOSIS — I69.998 OTHER SEQUELAE FOLLOWING UNSPECIFIED CEREBROVASCULAR DISEASE: ICD-10-CM

## 2020-10-16 DIAGNOSIS — R56.9 UNSPECIFIED CONVULSIONS: ICD-10-CM

## 2021-06-27 ENCOUNTER — INPATIENT (INPATIENT)
Facility: HOSPITAL | Age: 56
LOS: 2 days | Discharge: HOME | End: 2021-06-30
Attending: INTERNAL MEDICINE | Admitting: INTERNAL MEDICINE
Payer: MEDICAID

## 2021-06-27 VITALS
TEMPERATURE: 98 F | DIASTOLIC BLOOD PRESSURE: 87 MMHG | SYSTOLIC BLOOD PRESSURE: 140 MMHG | HEART RATE: 93 BPM | OXYGEN SATURATION: 98 % | RESPIRATION RATE: 16 BRPM | WEIGHT: 190.04 LBS | HEIGHT: 64 IN

## 2021-06-27 DIAGNOSIS — E87.6 HYPOKALEMIA: ICD-10-CM

## 2021-06-27 DIAGNOSIS — E78.5 HYPERLIPIDEMIA, UNSPECIFIED: ICD-10-CM

## 2021-06-27 DIAGNOSIS — R47.1 DYSARTHRIA AND ANARTHRIA: ICD-10-CM

## 2021-06-27 DIAGNOSIS — R29.707 NIHSS SCORE 7: ICD-10-CM

## 2021-06-27 DIAGNOSIS — E83.39 OTHER DISORDERS OF PHOSPHORUS METABOLISM: ICD-10-CM

## 2021-06-27 DIAGNOSIS — I10 ESSENTIAL (PRIMARY) HYPERTENSION: ICD-10-CM

## 2021-06-27 DIAGNOSIS — I69.320 APHASIA FOLLOWING CEREBRAL INFARCTION: ICD-10-CM

## 2021-06-27 DIAGNOSIS — G40.909 EPILEPSY, UNSPECIFIED, NOT INTRACTABLE, WITHOUT STATUS EPILEPTICUS: ICD-10-CM

## 2021-06-27 DIAGNOSIS — R47.01 APHASIA: ICD-10-CM

## 2021-06-27 DIAGNOSIS — I63.9 CEREBRAL INFARCTION, UNSPECIFIED: ICD-10-CM

## 2021-06-27 DIAGNOSIS — E11.9 TYPE 2 DIABETES MELLITUS WITHOUT COMPLICATIONS: ICD-10-CM

## 2021-06-27 DIAGNOSIS — R27.8 OTHER LACK OF COORDINATION: ICD-10-CM

## 2021-06-27 PROBLEM — R56.9 UNSPECIFIED CONVULSIONS: Chronic | Status: ACTIVE | Noted: 2020-10-05

## 2021-06-27 LAB
ALBUMIN SERPL ELPH-MCNC: 4.3 G/DL — SIGNIFICANT CHANGE UP (ref 3.3–5)
ALP SERPL-CCNC: 89 U/L — SIGNIFICANT CHANGE UP (ref 40–120)
ALT FLD-CCNC: 45 U/L — SIGNIFICANT CHANGE UP (ref 12–78)
AMMONIA BLD-MCNC: 25 UMOL/L — SIGNIFICANT CHANGE UP (ref 11–32)
AMPHET UR-MCNC: NEGATIVE — SIGNIFICANT CHANGE UP
ANION GAP SERPL CALC-SCNC: 7 MMOL/L — SIGNIFICANT CHANGE UP (ref 5–17)
APTT BLD: 27.9 SEC — SIGNIFICANT CHANGE UP (ref 27.5–35.5)
AST SERPL-CCNC: 42 U/L — HIGH (ref 15–37)
BARBITURATES UR SCN-MCNC: NEGATIVE — SIGNIFICANT CHANGE UP
BASOPHILS # BLD AUTO: 0.04 K/UL — SIGNIFICANT CHANGE UP (ref 0–0.2)
BASOPHILS NFR BLD AUTO: 0.5 % — SIGNIFICANT CHANGE UP (ref 0–2)
BENZODIAZ UR-MCNC: NEGATIVE — SIGNIFICANT CHANGE UP
BILIRUB SERPL-MCNC: 1 MG/DL — SIGNIFICANT CHANGE UP (ref 0.2–1.2)
BUN SERPL-MCNC: 15 MG/DL — SIGNIFICANT CHANGE UP (ref 7–23)
CALCIUM SERPL-MCNC: 8.4 MG/DL — LOW (ref 8.5–10.1)
CHLORIDE SERPL-SCNC: 108 MMOL/L — SIGNIFICANT CHANGE UP (ref 96–108)
CO2 SERPL-SCNC: 24 MMOL/L — SIGNIFICANT CHANGE UP (ref 22–31)
COCAINE METAB.OTHER UR-MCNC: NEGATIVE — SIGNIFICANT CHANGE UP
CREAT SERPL-MCNC: 0.79 MG/DL — SIGNIFICANT CHANGE UP (ref 0.5–1.3)
EOSINOPHIL # BLD AUTO: 0.02 K/UL — SIGNIFICANT CHANGE UP (ref 0–0.5)
EOSINOPHIL NFR BLD AUTO: 0.3 % — SIGNIFICANT CHANGE UP (ref 0–6)
ETHANOL SERPL-MCNC: <10 MG/DL — SIGNIFICANT CHANGE UP (ref 0–10)
FLUAV AG NPH QL: SIGNIFICANT CHANGE UP
FLUBV AG NPH QL: SIGNIFICANT CHANGE UP
GLUCOSE BLDC GLUCOMTR-MCNC: 112 MG/DL — HIGH (ref 70–99)
GLUCOSE BLDC GLUCOMTR-MCNC: 113 MG/DL — HIGH (ref 70–99)
GLUCOSE SERPL-MCNC: 109 MG/DL — HIGH (ref 70–99)
HCT VFR BLD CALC: 41.7 % — SIGNIFICANT CHANGE UP (ref 39–50)
HGB BLD-MCNC: 14.5 G/DL — SIGNIFICANT CHANGE UP (ref 13–17)
IMM GRANULOCYTES NFR BLD AUTO: 0.4 % — SIGNIFICANT CHANGE UP (ref 0–1.5)
INR BLD: 1.13 RATIO — SIGNIFICANT CHANGE UP (ref 0.88–1.16)
LACTATE SERPL-SCNC: 1.3 MMOL/L — SIGNIFICANT CHANGE UP (ref 0.7–2)
LYMPHOCYTES # BLD AUTO: 1.18 K/UL — SIGNIFICANT CHANGE UP (ref 1–3.3)
LYMPHOCYTES # BLD AUTO: 14.8 % — SIGNIFICANT CHANGE UP (ref 13–44)
MCHC RBC-ENTMCNC: 31.3 PG — SIGNIFICANT CHANGE UP (ref 27–34)
MCHC RBC-ENTMCNC: 34.8 GM/DL — SIGNIFICANT CHANGE UP (ref 32–36)
MCV RBC AUTO: 89.9 FL — SIGNIFICANT CHANGE UP (ref 80–100)
METHADONE UR-MCNC: NEGATIVE — SIGNIFICANT CHANGE UP
MONOCYTES # BLD AUTO: 0.41 K/UL — SIGNIFICANT CHANGE UP (ref 0–0.9)
MONOCYTES NFR BLD AUTO: 5.1 % — SIGNIFICANT CHANGE UP (ref 2–14)
NEUTROPHILS # BLD AUTO: 6.3 K/UL — SIGNIFICANT CHANGE UP (ref 1.8–7.4)
NEUTROPHILS NFR BLD AUTO: 78.9 % — HIGH (ref 43–77)
NRBC # BLD: 0 /100 WBCS — SIGNIFICANT CHANGE UP (ref 0–0)
OPIATES UR-MCNC: NEGATIVE — SIGNIFICANT CHANGE UP
PCP SPEC-MCNC: SIGNIFICANT CHANGE UP
PCP UR-MCNC: NEGATIVE — SIGNIFICANT CHANGE UP
PLATELET # BLD AUTO: 223 K/UL — SIGNIFICANT CHANGE UP (ref 150–400)
POTASSIUM SERPL-MCNC: 3.4 MMOL/L — LOW (ref 3.5–5.3)
POTASSIUM SERPL-SCNC: 3.4 MMOL/L — LOW (ref 3.5–5.3)
PROT SERPL-MCNC: 7.4 GM/DL — SIGNIFICANT CHANGE UP (ref 6–8.3)
PROTHROM AB SERPL-ACNC: 13 SEC — SIGNIFICANT CHANGE UP (ref 10.6–13.6)
RBC # BLD: 4.64 M/UL — SIGNIFICANT CHANGE UP (ref 4.2–5.8)
RBC # FLD: 13.1 % — SIGNIFICANT CHANGE UP (ref 10.3–14.5)
SARS-COV-2 RNA SPEC QL NAA+PROBE: SIGNIFICANT CHANGE UP
SODIUM SERPL-SCNC: 139 MMOL/L — SIGNIFICANT CHANGE UP (ref 135–145)
THC UR QL: NEGATIVE — SIGNIFICANT CHANGE UP
TROPONIN I SERPL-MCNC: <.015 NG/ML — SIGNIFICANT CHANGE UP (ref 0.01–0.04)
WBC # BLD: 7.98 K/UL — SIGNIFICANT CHANGE UP (ref 3.8–10.5)
WBC # FLD AUTO: 7.98 K/UL — SIGNIFICANT CHANGE UP (ref 3.8–10.5)

## 2021-06-27 PROCEDURE — 70496 CT ANGIOGRAPHY HEAD: CPT | Mod: 26,MA

## 2021-06-27 PROCEDURE — 71045 X-RAY EXAM CHEST 1 VIEW: CPT | Mod: 26

## 2021-06-27 PROCEDURE — 70498 CT ANGIOGRAPHY NECK: CPT | Mod: 26,MA

## 2021-06-27 PROCEDURE — 99291 CRITICAL CARE FIRST HOUR: CPT

## 2021-06-27 PROCEDURE — 93010 ELECTROCARDIOGRAM REPORT: CPT

## 2021-06-27 PROCEDURE — 0042T: CPT

## 2021-06-27 RX ORDER — SODIUM CHLORIDE 9 MG/ML
1000 INJECTION, SOLUTION INTRAVENOUS
Refills: 0 | Status: DISCONTINUED | OUTPATIENT
Start: 2021-06-27 | End: 2021-06-30

## 2021-06-27 RX ORDER — ALTEPLASE 100 MG
69.8 KIT INTRAVENOUS ONCE
Refills: 0 | Status: COMPLETED | OUTPATIENT
Start: 2021-06-27 | End: 2021-06-27

## 2021-06-27 RX ORDER — CHLORHEXIDINE GLUCONATE 213 G/1000ML
1 SOLUTION TOPICAL
Refills: 0 | Status: DISCONTINUED | OUTPATIENT
Start: 2021-06-27 | End: 2021-06-30

## 2021-06-27 RX ORDER — POTASSIUM CHLORIDE 20 MEQ
10 PACKET (EA) ORAL
Refills: 0 | Status: COMPLETED | OUTPATIENT
Start: 2021-06-27 | End: 2021-06-27

## 2021-06-27 RX ORDER — INSULIN LISPRO 100/ML
VIAL (ML) SUBCUTANEOUS EVERY 6 HOURS
Refills: 0 | Status: DISCONTINUED | OUTPATIENT
Start: 2021-06-27 | End: 2021-06-28

## 2021-06-27 RX ORDER — LEVETIRACETAM 250 MG/1
500 TABLET, FILM COATED ORAL EVERY 12 HOURS
Refills: 0 | Status: DISCONTINUED | OUTPATIENT
Start: 2021-06-27 | End: 2021-06-28

## 2021-06-27 RX ORDER — DEXTROSE 50 % IN WATER 50 %
12.5 SYRINGE (ML) INTRAVENOUS ONCE
Refills: 0 | Status: DISCONTINUED | OUTPATIENT
Start: 2021-06-27 | End: 2021-06-28

## 2021-06-27 RX ORDER — ALTEPLASE 100 MG
7.8 KIT INTRAVENOUS ONCE
Refills: 0 | Status: COMPLETED | OUTPATIENT
Start: 2021-06-27 | End: 2021-06-27

## 2021-06-27 RX ORDER — GLUCAGON INJECTION, SOLUTION 0.5 MG/.1ML
1 INJECTION, SOLUTION SUBCUTANEOUS ONCE
Refills: 0 | Status: DISCONTINUED | OUTPATIENT
Start: 2021-06-27 | End: 2021-06-30

## 2021-06-27 RX ORDER — DEXTROSE 50 % IN WATER 50 %
25 SYRINGE (ML) INTRAVENOUS ONCE
Refills: 0 | Status: DISCONTINUED | OUTPATIENT
Start: 2021-06-27 | End: 2021-06-28

## 2021-06-27 RX ORDER — DEXTROSE 50 % IN WATER 50 %
15 SYRINGE (ML) INTRAVENOUS ONCE
Refills: 0 | Status: DISCONTINUED | OUTPATIENT
Start: 2021-06-27 | End: 2021-06-28

## 2021-06-27 RX ORDER — SODIUM CHLORIDE 9 MG/ML
1000 INJECTION INTRAMUSCULAR; INTRAVENOUS; SUBCUTANEOUS
Refills: 0 | Status: DISCONTINUED | OUTPATIENT
Start: 2021-06-27 | End: 2021-06-27

## 2021-06-27 RX ADMIN — Medication 100 MILLIEQUIVALENT(S): at 21:25

## 2021-06-27 RX ADMIN — ALTEPLASE 69.8 MILLIGRAM(S): KIT at 16:14

## 2021-06-27 RX ADMIN — CHLORHEXIDINE GLUCONATE 1 APPLICATION(S): 213 SOLUTION TOPICAL at 19:00

## 2021-06-27 RX ADMIN — SODIUM CHLORIDE 1000 MILLILITER(S): 9 INJECTION INTRAMUSCULAR; INTRAVENOUS; SUBCUTANEOUS at 16:19

## 2021-06-27 RX ADMIN — SODIUM CHLORIDE 125 MILLILITER(S): 9 INJECTION INTRAMUSCULAR; INTRAVENOUS; SUBCUTANEOUS at 15:36

## 2021-06-27 RX ADMIN — ALTEPLASE 7.8 MILLIGRAM(S): KIT at 15:13

## 2021-06-27 RX ADMIN — ALTEPLASE 69.8 MILLIGRAM(S): KIT at 15:14

## 2021-06-27 RX ADMIN — Medication 100 MILLIEQUIVALENT(S): at 22:21

## 2021-06-27 RX ADMIN — LEVETIRACETAM 420 MILLIGRAM(S): 250 TABLET, FILM COATED ORAL at 20:16

## 2021-06-27 RX ADMIN — Medication 100 MILLIEQUIVALENT(S): at 20:16

## 2021-06-27 RX ADMIN — ALTEPLASE 468 MILLIGRAM(S): KIT at 15:13

## 2021-06-27 NOTE — STROKE CODE NOTE - ASSESSMENT/PLAN
Interaction limited by poor audio connection.     Patient is a 55 year old male with a PMH of T2Dm and prior left MCA infarct (on aspirin per records) complicated by localization-related epilepsy/seizures (10/2020, rec'd Keppra; unclear if taking). Patient was at work when he was witnessed by co-workers to have "near syncope" (unclear details) and following was unable to talk/comprehend. Time per EMS of symptoms onset was 1 pm. There was no witnessed seizure activity. Thus, he was brought to the ED.  NIHSS was 7 as above: no focal weakness but globally aphasic. HCT showed chronic left MCA stroke. CTA showed diminutive MCA branches but no LVO or significant stenosis elsewhere. CTP showed area of core infarct in area of prior chronic infarct as well as diffusely increased hypoperfusion, suspected at least in part artifact.      Differential includes presyncope/hypoperfusion event leading to stroke vs embolic event leading to stroke vs seizure secondary to prior infarct. In the absence of absolute contraindications which ED will try to confirm (though there is limited collateral), I have recommended tPA.  Patient should be admitted and connected to EEG to r/o seizure as well. Unclear if he is taking Keppra as previously recommended.

## 2021-06-27 NOTE — H&P ADULT - ASSESSMENT
54 YO male with PMH DM2, HTN, HLD, previous CVA, seizure hx (thought to be from old CVA), presents for near-syncope and AMS today around 1pm while at work, admitted for R/O CVA.  Pt received TPA bolus at 1513.  ICU consulted for post-TPA management and care.      NEURO: CTH with old left MCA infarct, s/p TPA at 1513, continue to monitor neuro status Q1 hour per protocol. will need repeat CTH in 24 hours, earlier if with any neuro changes. pt remains aphasic - he is alert, oriented, responds to all questions, but appears to be unable to find the correct words to some questions asked. moving all extremities, 5/5 strength. will need eventual asa/statin added when can tolerate meds. check lipid/a1c. PT/OT. neuro consult  CV: no acute issues at present, SBP 130s, will hold home antihypertensives at this time, reintroduce as necessary  RESP: no acute issues, tolerating room air  GI: NPO currently pending dysphagia screen and S&S  ID: no acute issues  ENDO: hx of DM2, will check a1c, monitor FS with low ISS  PPX: SCDs for now given recent TPA  GOC: full  DISPO: ICU 54 YO male with PMH DM2, HTN, HLD, previous CVA, seizure hx (thought to be from old CVA), presents for near-syncope and AMS today around 1pm while at work, admitted for R/O CVA.  Pt received TPA bolus at 1513.  ICU consulted for post-TPA management and care.      NEURO: CTH with old left MCA infarct, s/p TPA at 1513, continue to monitor neuro status Q1 hour per protocol. will need repeat CTH in 24 hours, earlier if with any neuro changes. pt remains aphasic - he is alert, oriented, responds to all questions, but appears to be unable to find the correct words to some questions asked. moving all extremities, 5/5 strength. will need eventual asa/statin added when can tolerate meds. check lipid/a1c. PT/OT. US carotid, will likely need MR brain after 24 hour mike and repeat CT. neuro consult  CV: no acute issues at present, SBP 130s, will hold home antihypertensives at this time, reintroduce as necessary. check TTE with bubble  RESP: no acute issues, tolerating room air  GI: NPO currently pending dysphagia screen and S&S  ID: no acute issues  ENDO: hx of DM2, will check a1c, monitor FS with low ISS  PPX: SCDs for now given recent TPA  GOC: full  DISPO: ICU 54 YO male with PMH DM2, HTN, HLD, previous CVA, seizure hx (thought to be from old CVA), presents for near-syncope and AMS today around 1pm while at work, admitted for R/O CVA.  Pt received TPA bolus at 1513.  ICU consulted for post-TPA management and care.      NEURO: CTH with old left MCA infarct, s/p TPA at 1513, continue to monitor neuro status Q1 hour per protocol. will need repeat CTH in 24 hours, earlier if with any neuro changes. pt remains aphasic - he is alert, oriented, responds to all questions, but appears to be unable to find the correct words to some questions asked. moving all extremities, 5/5 strength. will need eventual asa/statin added when can tolerate meds. check lipid/a1c. PT/OT. will likely need MR brain after 24 hour mike and repeat CT. neuro consult  CV: no acute issues at present, SBP 130s, will hold home antihypertensives at this time, reintroduce as necessary. check TTE with bubble  RESP: no acute issues, tolerating room air  GI: NPO currently pending dysphagia screen and S&S  ID: no acute issues  ENDO: hx of DM2, will check a1c, monitor FS with low ISS  PPX: SCDs for now given recent TPA  GOC: full  DISPO: ICU 56 YO male with PMH DM2, HTN, HLD, previous CVA, seizure hx (thought to be from old CVA), presents for near-syncope and AMS today around 1pm while at work, admitted for R/O CVA.  Pt received TPA bolus at 1513.  ICU consulted for post-TPA management and care.      NEURO: CTH with old left MCA infarct, s/p TPA at 1513, continue to monitor neuro status Q1 hour per protocol. will need repeat CTH in 24 hours, earlier if with any neuro changes. pt remains aphasic - he is alert, oriented, responds to all questions, but appears to be unable to find the correct words to some questions asked. moving all extremities, 5/5 strength. will need eventual asa/statin added when can tolerate meds. check lipid/a1c. PT/OT. will likely need MR brain after 24 hour miek and repeat CT. hx of seizures, tonic clonic seizure (presumed 2/2 to old infarct) in 10/2020 and was placed on keppra after that, unsure if compliant, but will continue here 500 BID. neuro consult  CV: no acute issues at present, SBP 130s, will hold home antihypertensives at this time, reintroduce as necessary. check TTE with bubble  RESP: no acute issues, tolerating room air  GI: NPO currently pending dysphagia screen and S&S  ID: no acute issues  ENDO: hx of DM2, will check a1c, monitor FS with low ISS  PPX: SCDs for now given recent TPA  GOC: full  DISPO: ICU

## 2021-06-27 NOTE — ED ADULT NURSE REASSESSMENT NOTE - NS ED NURSE REASSESS COMMENT FT1
Report endorsed to oncoming ED HOld RN (ICU RN) Sherrell.  Safety checks compld this shift/Safety rounds completed hourly.  IV sites checked Q2+remains WDL. Meds given as ord with no s/s of adverse RXNs. Fall +skin precs in place. Any issues endorsed to oncoming RN for follow up.

## 2021-06-27 NOTE — ED PROVIDER NOTE - NSSTROKETPADOOR_FURTHEREVAL
Further diagnostic evaluation to confirm stroke for patients with hypoglycemia, seizures, or major metabolic disorder

## 2021-06-27 NOTE — ED PROVIDER NOTE - NIH STROKE SCALE: 9. BEST LANGUAGE, QM
(0) No aphasia; normal (1) Mild-to-moderate aphasia; some obvious loss of fluency or facility of comprehension, without significant limitation on ideas expressed or form of expression. Reduction of speech and/or comprehension, however, makes conversation about provided material difficult or impossible. For example, in conversation about provided materials, examiner can identify picture or naming card content from patient's response.

## 2021-06-27 NOTE — ED ADULT NURSE NOTE - OBJECTIVE STATEMENT
pt is a 55 year old male, presents status post syncope episode at work, in triage pt is unable to answer questions, follow commands.

## 2021-06-27 NOTE — ED PROVIDER NOTE - CRITICAL CARE ATTENDING CONTRIBUTION TO CARE
pt with determination of eligibilty of TPA first delay of TPA decision, otherwise thereafter pt determined to be elegible with stroke neurologist Dr. Friedman and pt was given TPA within 3 hours onset of symptoms.

## 2021-06-27 NOTE — ED PROVIDER NOTE - CLINICAL SUMMARY MEDICAL DECISION MAKING FREE TEXT BOX
pt evaluated by Telestroke Dr. Friedman to determine eligibilty of TPA as pt with abnormal aphasic symptoms. Otherwise after evaluation completed at 1455, order was placed for TPA as pt with continued confusion symptoms with aphasia, difficulty understanding and speaking/following instructions- Administration of TPA was delayed by 15 minutes due to pharmacy preparation. Was given as soon as TPA was received at 1513.

## 2021-06-27 NOTE — ED PROVIDER NOTE - DOMESTIC TRAVEL HIGH RISK QUESTION
You can access the FollowMyHealth Patient Portal offered by [facility] by registering at the following website: [email or web address]. By joining [facility]’s FollowMyHealth portal, you will also be able to view your health information using other applications (apps) compatible with our system. Unable to Assess

## 2021-06-27 NOTE — H&P ADULT - HISTORY OF PRESENT ILLNESS
56 YO male with PMH DM2, HTN, HLD, previous CVA, seizure hx (thought to be from old CVA), presents for near-syncope and AMS today around 1pm while at work.  Per charts, pt had near-syncopal episode at work with noted confusion where patient was unable to speak or understand.   He was brought to the ED by EMS where a code stroke was initiated. Noted to be alert and moving all extremities, but with aphasia as above, no other focal deficits.  CT head showing old left MCA infarct. NIH was 7.  Pt received TPA bolus at 1513.  ICU consulted for post-TPA management and care.

## 2021-06-27 NOTE — H&P ADULT - ATTENDING COMMENTS
56 YO male with PMH DM2, HTN, HLD, previous CVA, seizure hx (thought to be from old CVA), presents for near-syncope and w/ aphasia concerning for cva and received tpa in ER. Pt unable to provide to answer questions appropriately in English or Icelandic due to aphasia but able to follow commands. Pt deficits were reported to be worse on arrival NIHSS 7. Cont neuro checks as per protocol post tpa. Monitor for bleeding. Pt to remain NPO and will hold asa and statin until after swallow eval. Unclear if pt taking his AED for sz hx but will cont. Neuro consulted. 24h post tpa CTH planned. Will try contact family of patient but unable to clearly explain relationship/location.

## 2021-06-27 NOTE — ED PROVIDER NOTE - OBJECTIVE STATEMENT
Pt is a 55 year old male w/PMH HTN, DM, who presents to the ED today for near syncope symptoms while at work. Pt appeared to be looking like he was going to pass out then sat down. EMS noted some confusion by patient. No focal weakness on exam due to acute onset and nature of confusion pt was seen at triage at code for stroke evaluation. Difficulty following commands and answering question properly and in native language of [place],

## 2021-06-27 NOTE — ED ADULT NURSE NOTE - ED STAT RN HANDOFF DETAILS
Report endorsed to oncbony Song RN. Safety checks compld this shift/Safety rounds completed hourly.  IV sites checked Q2+remains WDL. Meds given as ord with no s/s of adverse RXNs. Fall +skin precs in place. Any issues endorsed to oncbony RN for follow up.   Pt taken to CCU4, pt AAX4, with two 20 gauge IV, all meds given, all needs are met, NAD, pt transported with 2 RNs, ED Tech,

## 2021-06-28 LAB
A1C WITH ESTIMATED AVERAGE GLUCOSE RESULT: 5.7 % — HIGH (ref 4–5.6)
ALBUMIN SERPL ELPH-MCNC: 3.7 G/DL — SIGNIFICANT CHANGE UP (ref 3.3–5)
ALP SERPL-CCNC: 73 U/L — SIGNIFICANT CHANGE UP (ref 40–120)
ALT FLD-CCNC: 36 U/L — SIGNIFICANT CHANGE UP (ref 12–78)
ANION GAP SERPL CALC-SCNC: 8 MMOL/L — SIGNIFICANT CHANGE UP (ref 5–17)
AST SERPL-CCNC: 32 U/L — SIGNIFICANT CHANGE UP (ref 15–37)
BILIRUB SERPL-MCNC: 1.5 MG/DL — HIGH (ref 0.2–1.2)
BUN SERPL-MCNC: 12 MG/DL — SIGNIFICANT CHANGE UP (ref 7–23)
CALCIUM SERPL-MCNC: 8.4 MG/DL — LOW (ref 8.5–10.1)
CHLORIDE SERPL-SCNC: 107 MMOL/L — SIGNIFICANT CHANGE UP (ref 96–108)
CHOLEST SERPL-MCNC: 109 MG/DL — SIGNIFICANT CHANGE UP
CO2 SERPL-SCNC: 24 MMOL/L — SIGNIFICANT CHANGE UP (ref 22–31)
COVID-19 SPIKE DOMAIN AB INTERP: POSITIVE
COVID-19 SPIKE DOMAIN ANTIBODY RESULT: >250 U/ML — HIGH
CREAT SERPL-MCNC: 0.68 MG/DL — SIGNIFICANT CHANGE UP (ref 0.5–1.3)
ESTIMATED AVERAGE GLUCOSE: 117 MG/DL — HIGH (ref 68–114)
GLUCOSE BLDC GLUCOMTR-MCNC: 103 MG/DL — HIGH (ref 70–99)
GLUCOSE BLDC GLUCOMTR-MCNC: 109 MG/DL — HIGH (ref 70–99)
GLUCOSE BLDC GLUCOMTR-MCNC: 118 MG/DL — HIGH (ref 70–99)
GLUCOSE BLDC GLUCOMTR-MCNC: 145 MG/DL — HIGH (ref 70–99)
GLUCOSE BLDC GLUCOMTR-MCNC: 90 MG/DL — SIGNIFICANT CHANGE UP (ref 70–99)
GLUCOSE SERPL-MCNC: 103 MG/DL — HIGH (ref 70–99)
HCT VFR BLD CALC: 40.3 % — SIGNIFICANT CHANGE UP (ref 39–50)
HDLC SERPL-MCNC: 51 MG/DL — SIGNIFICANT CHANGE UP
HGB BLD-MCNC: 14 G/DL — SIGNIFICANT CHANGE UP (ref 13–17)
LIPID PNL WITH DIRECT LDL SERPL: 44 MG/DL — SIGNIFICANT CHANGE UP
MAGNESIUM SERPL-MCNC: 2.2 MG/DL — SIGNIFICANT CHANGE UP (ref 1.6–2.6)
MCHC RBC-ENTMCNC: 31.4 PG — SIGNIFICANT CHANGE UP (ref 27–34)
MCHC RBC-ENTMCNC: 34.7 GM/DL — SIGNIFICANT CHANGE UP (ref 32–36)
MCV RBC AUTO: 90.4 FL — SIGNIFICANT CHANGE UP (ref 80–100)
NON HDL CHOLESTEROL: 58 MG/DL — SIGNIFICANT CHANGE UP
NRBC # BLD: 0 /100 WBCS — SIGNIFICANT CHANGE UP (ref 0–0)
PHOSPHATE SERPL-MCNC: 2.3 MG/DL — LOW (ref 2.5–4.5)
PLATELET # BLD AUTO: 213 K/UL — SIGNIFICANT CHANGE UP (ref 150–400)
POTASSIUM SERPL-MCNC: 3.3 MMOL/L — LOW (ref 3.5–5.3)
POTASSIUM SERPL-SCNC: 3.3 MMOL/L — LOW (ref 3.5–5.3)
PROT SERPL-MCNC: 7.1 GM/DL — SIGNIFICANT CHANGE UP (ref 6–8.3)
RBC # BLD: 4.46 M/UL — SIGNIFICANT CHANGE UP (ref 4.2–5.8)
RBC # FLD: 12.9 % — SIGNIFICANT CHANGE UP (ref 10.3–14.5)
SARS-COV-2 IGG+IGM SERPL QL IA: >250 U/ML — HIGH
SARS-COV-2 IGG+IGM SERPL QL IA: POSITIVE
SODIUM SERPL-SCNC: 139 MMOL/L — SIGNIFICANT CHANGE UP (ref 135–145)
TRIGL SERPL-MCNC: 69 MG/DL — SIGNIFICANT CHANGE UP
WBC # BLD: 6.96 K/UL — SIGNIFICANT CHANGE UP (ref 3.8–10.5)
WBC # FLD AUTO: 6.96 K/UL — SIGNIFICANT CHANGE UP (ref 3.8–10.5)

## 2021-06-28 PROCEDURE — 70450 CT HEAD/BRAIN W/O DYE: CPT | Mod: 26

## 2021-06-28 PROCEDURE — 99233 SBSQ HOSP IP/OBS HIGH 50: CPT

## 2021-06-28 PROCEDURE — 93306 TTE W/DOPPLER COMPLETE: CPT | Mod: 26

## 2021-06-28 RX ORDER — LEVETIRACETAM 250 MG/1
500 TABLET, FILM COATED ORAL
Refills: 0 | Status: DISCONTINUED | OUTPATIENT
Start: 2021-06-28 | End: 2021-06-30

## 2021-06-28 RX ORDER — ENOXAPARIN SODIUM 100 MG/ML
40 INJECTION SUBCUTANEOUS DAILY
Refills: 0 | Status: DISCONTINUED | OUTPATIENT
Start: 2021-06-28 | End: 2021-06-30

## 2021-06-28 RX ORDER — GLUCAGON INJECTION, SOLUTION 0.5 MG/.1ML
1 INJECTION, SOLUTION SUBCUTANEOUS ONCE
Refills: 0 | Status: DISCONTINUED | OUTPATIENT
Start: 2021-06-28 | End: 2021-06-30

## 2021-06-28 RX ORDER — DEXTROSE 50 % IN WATER 50 %
25 SYRINGE (ML) INTRAVENOUS ONCE
Refills: 0 | Status: DISCONTINUED | OUTPATIENT
Start: 2021-06-28 | End: 2021-06-30

## 2021-06-28 RX ORDER — INSULIN LISPRO 100/ML
VIAL (ML) SUBCUTANEOUS
Refills: 0 | Status: DISCONTINUED | OUTPATIENT
Start: 2021-06-28 | End: 2021-06-30

## 2021-06-28 RX ORDER — DEXTROSE 50 % IN WATER 50 %
12.5 SYRINGE (ML) INTRAVENOUS ONCE
Refills: 0 | Status: DISCONTINUED | OUTPATIENT
Start: 2021-06-28 | End: 2021-06-30

## 2021-06-28 RX ORDER — SODIUM CHLORIDE 9 MG/ML
1000 INJECTION, SOLUTION INTRAVENOUS
Refills: 0 | Status: DISCONTINUED | OUTPATIENT
Start: 2021-06-28 | End: 2021-06-28

## 2021-06-28 RX ORDER — ATORVASTATIN CALCIUM 80 MG/1
80 TABLET, FILM COATED ORAL AT BEDTIME
Refills: 0 | Status: DISCONTINUED | OUTPATIENT
Start: 2021-06-28 | End: 2021-06-30

## 2021-06-28 RX ORDER — DEXTROSE 50 % IN WATER 50 %
15 SYRINGE (ML) INTRAVENOUS ONCE
Refills: 0 | Status: DISCONTINUED | OUTPATIENT
Start: 2021-06-28 | End: 2021-06-30

## 2021-06-28 RX ORDER — INSULIN LISPRO 100/ML
VIAL (ML) SUBCUTANEOUS AT BEDTIME
Refills: 0 | Status: DISCONTINUED | OUTPATIENT
Start: 2021-06-28 | End: 2021-06-30

## 2021-06-28 RX ORDER — ATORVASTATIN CALCIUM 80 MG/1
80 TABLET, FILM COATED ORAL AT BEDTIME
Refills: 0 | Status: DISCONTINUED | OUTPATIENT
Start: 2021-06-28 | End: 2021-06-28

## 2021-06-28 RX ORDER — ASPIRIN/CALCIUM CARB/MAGNESIUM 324 MG
81 TABLET ORAL DAILY
Refills: 0 | Status: DISCONTINUED | OUTPATIENT
Start: 2021-06-28 | End: 2021-06-30

## 2021-06-28 RX ORDER — POTASSIUM PHOSPHATE, MONOBASIC POTASSIUM PHOSPHATE, DIBASIC 236; 224 MG/ML; MG/ML
15 INJECTION, SOLUTION INTRAVENOUS ONCE
Refills: 0 | Status: COMPLETED | OUTPATIENT
Start: 2021-06-28 | End: 2021-06-28

## 2021-06-28 RX ADMIN — ATORVASTATIN CALCIUM 80 MILLIGRAM(S): 80 TABLET, FILM COATED ORAL at 21:26

## 2021-06-28 RX ADMIN — LEVETIRACETAM 420 MILLIGRAM(S): 250 TABLET, FILM COATED ORAL at 06:07

## 2021-06-28 RX ADMIN — ENOXAPARIN SODIUM 40 MILLIGRAM(S): 100 INJECTION SUBCUTANEOUS at 17:04

## 2021-06-28 RX ADMIN — POTASSIUM PHOSPHATE, MONOBASIC POTASSIUM PHOSPHATE, DIBASIC 62.5 MILLIMOLE(S): 236; 224 INJECTION, SOLUTION INTRAVENOUS at 06:12

## 2021-06-28 RX ADMIN — Medication 81 MILLIGRAM(S): at 16:51

## 2021-06-28 RX ADMIN — LEVETIRACETAM 500 MILLIGRAM(S): 250 TABLET, FILM COATED ORAL at 17:04

## 2021-06-28 NOTE — CONSULT NOTE ADULT - ASSESSMENT
54 YO male with PMH DM2, HTN, HLD, previous L MCA territory  CVA,here with near syncope and aphasia, s/p TPA. Neuro exam with mild aphasia subtle right facial, right visual extinction, right Ue pronator drift CtH with chronic Left sided CVA. CTA reviewed with no acute findings NIHSS 5    Impression: CVA vs recrudescence of prior CVA    goal BP < 180/110  Neuro checks q2hr in Unit  Dysphagia screen in 12-24hrs from TPA    >Imaging/Studies    MRI brain w/o cont  Repeat Head CT or MRI 24hrs after TPA evaluate for hemorrhagic conversion or get CTH earlier for change in mental status  TTE  Telemetry monitoring     >Labs  HgA1c  Lipid profile      >Meds  ASA 81mg after 24hrs if no significant bleeding on repeat imaging  Lipitor 80mg qhs      PT/ OT  Speech and Swallow Evaluation

## 2021-06-28 NOTE — SWALLOW BEDSIDE ASSESSMENT ADULT - COMMENTS
CT head 6/27/2021 IMPRESSION:  CT brain: There is no acute lobar infarction, intracranial hemorrhage or mass effect. Further correlation with MRI of the brain is suggested for more detailed evaluation to evaluate for an acute on chronic infarction or acute lacunar infarction not detected by CT.  There is chronic infarction along left MCA territory.    CXR 6/27/2021 INTERPRETATION:  Stroke codeAP chest. Prior 10/5/2020.  Cardiomediastinal silhouette and pulmonary vasculature exaggerated by AP film and shallow inspiration. Grossly clear lungs. Old fracture left distal clavicle. IMPRESSION: Shallow inspiration. Grossly clear lungs.

## 2021-06-28 NOTE — CHART NOTE - NSCHARTNOTEFT_GEN_A_CORE
CCU Transfer Note    Transfer from: CCU    Transfer to: ( X) Medicine    (  ) Telemetry    (  ) RCU                               (  ) Palliative    (  ) Stroke Unit    (  ) MICU    (  ) __________________    Accepting physician:  Sign out given to:     HPI / CCU COURSE:  54 YO male with PMH DM2, HTN, HLD, previous CVA, seizure hx (thought to be from old CVA), presents for near-syncope and AMS today around 1pm while at work.  Per charts, pt had near-syncopal episode at work with noted confusion where patient was unable to speak or understand.   He was brought to the ED by EMS where a code stroke was initiated. Noted to be alert and moving all extremities, but with aphasia as above, no other focal deficits.  CT head showing old left MCA infarct. NIH was 7.  Pt received TPA bolus at 1513.  ICU consulted for post-TPA management and care. (27 Jun 2021 15:57)  54 YO male with PMH DM2, HTN, HLD, previous CVA, seizure hx   6/28 Repeat Ct Head   IMPRESSION:  1)  large old left MCA infarct. No acute abnormality or hemorrhage suggested.  2)  follow-up MR imaging recommended for further evaluation.      ROS: All other review of systems is negative unless indicated above.    Allergies/ Intolerances  Allergy Status Unknown      PAST MEDICAL & SURGICAL HISTORY:  Seizure  Diabetes  History of ischemic left MCA stroke  No significant past surgical history      Current Medications:   MEDICATIONS  (STANDING):  aspirin enteric coated 81 milliGRAM(s) Oral daily  atorvastatin 80 milliGRAM(s) Oral at bedtime  enoxaparin Injectable 40 milliGRAM(s) SubCutaneous daily  insulin lispro (ADMELOG) corrective regimen sliding scale   SubCutaneous three times a day before meals  insulin lispro (ADMELOG) corrective regimen sliding scale   SubCutaneous at bedtime  levETIRAcetam 500 milliGRAM(s) Oral two times a day      ====================  Vital Signs Last 24 Hrs  T(C): 37.1 (28 Jun 2021 15:32), Max: 37.6 (27 Jun 2021 18:50)  T(F): 98.8 (28 Jun 2021 15:32), Max: 99.6 (27 Jun 2021 18:50)  HR: 77 (28 Jun 2021 17:00) (63 - 103)  BP: 123/77 (28 Jun 2021 17:00) (106/71 - 145/87)  BP(mean): 92 (28 Jun 2021 17:00) (81 - 109)  RR: 18 (28 Jun 2021 17:00) (16 - 36)  SpO2: 95% (28 Jun 2021 17:00) (87% - 98%)      I&O's Detail  28 Jun 2021 07:01  -  28 Jun 2021 17:28  --------------------------------------------------------  IN:    Oral Fluid: 550 mL  Total IN: 550 mL    OUT:    Voided (mL): 1250 mL  Total OUT: 1250 mL    Total NET: -700 mL      Physical Exam:   General: NAD  HEENT: PERRL, EOMI, normal sclera and conjunctiva, no oral lesions  Neck: Supple, no JVD  Lungs: CTA bilaterally  Heart: RRR, normal S1S2, no murmurs/rubs/gallops  Abdomen: Soft, ND/NT  Extremities: 2+ peripheral pulses, no cyanosis/clubbing/edema, full ROM  Skin: Warm, well-perfused  Neuro: A&O x3,   acalcula--> patient with difficulty with numbers, cant count back from 10, can not recall dates       ====================  Labs & Imaging:   CBC Full  -  ( 28 Jun 2021 04:22 )  WBC Count : 6.96 K/uL  RBC Count : 4.46 M/uL  Hemoglobin : 14.0 g/dL  Hematocrit : 40.3 %  Platelet Count - Automated : 213 K/uL  Mean Cell Volume : 90.4 fl  Mean Cell Hemoglobin : 31.4 pg  Mean Cell Hemoglobin Concentration : 34.7 gm/dL    06-28    139  |  107  |  12  ----------------------------<  103<H>  3.3<L>   |  24  |  0.68    Ca    8.4<L>      28 Jun 2021 04:22  Phos  2.3     06-28  Mg     2.2     06-28    TPro  7.1  /  Alb  3.7  /  TBili  1.5<H>  /  DBili  x   /  AST  32  /  ALT  36  /  AlkPhos  73  06-28    PT/INR - ( 27 Jun 2021 14:57 )   PT: 13.0 sec;   INR: 1.13 ratio         PTT - ( 27 Jun 2021 14:57 )  PTT:27.9 sec    CARDIAC MARKERS ( 27 Jun 2021 14:57 )  <.015 ng/mL / x     / x     / x     / x                CT SCAN:     ULTRASOUND:     ASSESSMENT & PLAN:   54 YO male with PMH DM2, HTN, HLD, previous CVA, seizure hx (thought to be from old CVA), presents for near-syncope and AMS today around 1pm while at work.  Per charts, pt had near-syncopal episode at work with noted confusion where patient was unable to speak or understand.   He was brought to the ED by EMS where a code stroke was initiated. Noted to be alert and moving all extremities, but with aphasia as above, no other focal deficits.  CT head showing old left MCA infarct. NIH was 7.  Pt received TPA bolus at 1513. Repeat CT negative , patient unable to have MRI ue to retained metal Hip/ elbow surgical repair in [employer]   Patient exhibiting acalculia or difficulty with numbers- unable to count backwards recall dates         PLAN:  ========    NEURO:  -neuro checks per routine   - continue keppra Hx of seizures     -S/P TPA  -start ASA / high dose statin   -PT/OT/ speech   -ECHO -EF 55% no septal defect   PULM:  -maintain O2 > 92%    CV:  -Maintain MAP > 65  -continue statin     GI:  -advance diet as tolerated     RENAL:  -maintain urine output > 0.5cc/kg/hr     :  PUENTES yes [  ] NO [ X ] insertion date     ID:  -no sign of active infection     ENDO:  -continue sliding scale insulin     HEME:  - enoxaparin Injectable 40 milliGRAM(s) CCU Transfer Note    Transfer from: CCU    Transfer to: ( X) Medicine    (  ) Telemetry    (  ) RCU                               (  ) Palliative    (  ) Stroke Unit    (  ) MICU    (  ) __________________    Accepting physician: Dr Jacobs   Sign out given to: Dr Arriaga     HPI / CCU COURSE:  56 YO male with PMH DM2, HTN, HLD, previous CVA, seizure hx (thought to be from old CVA), presents for near-syncope and AMS today around 1pm while at work.  Per charts, pt had near-syncopal episode at work with noted confusion where patient was unable to speak or understand.   He was brought to the ED by EMS where a code stroke was initiated. Noted to be alert and moving all extremities, but with aphasia as above, no other focal deficits.  CT head showing old left MCA infarct. NIH was 7.  Pt received TPA bolus at 1513.  ICU consulted for post-TPA management and care. (27 Jun 2021 15:57)  56 YO male with PMH DM2, HTN, HLD, previous CVA, seizure hx   6/28 Repeat Ct Head   IMPRESSION:  1)  large old left MCA infarct. No acute abnormality or hemorrhage suggested.  2)  follow-up MR imaging recommended for further evaluation.      ROS: All other review of systems is negative unless indicated above.    Allergies/ Intolerances  Allergy Status Unknown      PAST MEDICAL & SURGICAL HISTORY:  Seizure  Diabetes  History of ischemic left MCA stroke  No significant past surgical history      Current Medications:   MEDICATIONS  (STANDING):  aspirin enteric coated 81 milliGRAM(s) Oral daily  atorvastatin 80 milliGRAM(s) Oral at bedtime  enoxaparin Injectable 40 milliGRAM(s) SubCutaneous daily  insulin lispro (ADMELOG) corrective regimen sliding scale   SubCutaneous three times a day before meals  insulin lispro (ADMELOG) corrective regimen sliding scale   SubCutaneous at bedtime  levETIRAcetam 500 milliGRAM(s) Oral two times a day      ====================  Vital Signs Last 24 Hrs  T(C): 37.1 (28 Jun 2021 15:32), Max: 37.6 (27 Jun 2021 18:50)  T(F): 98.8 (28 Jun 2021 15:32), Max: 99.6 (27 Jun 2021 18:50)  HR: 77 (28 Jun 2021 17:00) (63 - 103)  BP: 123/77 (28 Jun 2021 17:00) (106/71 - 145/87)  BP(mean): 92 (28 Jun 2021 17:00) (81 - 109)  RR: 18 (28 Jun 2021 17:00) (16 - 36)  SpO2: 95% (28 Jun 2021 17:00) (87% - 98%)      I&O's Detail  28 Jun 2021 07:01  -  28 Jun 2021 17:28  --------------------------------------------------------  IN:    Oral Fluid: 550 mL  Total IN: 550 mL    OUT:    Voided (mL): 1250 mL  Total OUT: 1250 mL    Total NET: -700 mL      Physical Exam:   General: NAD  HEENT: PERRL, EOMI, normal sclera and conjunctiva, no oral lesions  Neck: Supple, no JVD  Lungs: CTA bilaterally  Heart: RRR, normal S1S2, no murmurs/rubs/gallops  Abdomen: Soft, ND/NT  Extremities: 2+ peripheral pulses, no cyanosis/clubbing/edema, full ROM  Skin: Warm, well-perfused  Neuro: A&O x3,   acalcula--> patient with difficulty with numbers, cant count back from 10, can not recall dates       ====================  Labs & Imaging:   CBC Full  -  ( 28 Jun 2021 04:22 )  WBC Count : 6.96 K/uL  RBC Count : 4.46 M/uL  Hemoglobin : 14.0 g/dL  Hematocrit : 40.3 %  Platelet Count - Automated : 213 K/uL  Mean Cell Volume : 90.4 fl  Mean Cell Hemoglobin : 31.4 pg  Mean Cell Hemoglobin Concentration : 34.7 gm/dL    06-28    139  |  107  |  12  ----------------------------<  103<H>  3.3<L>   |  24  |  0.68    Ca    8.4<L>      28 Jun 2021 04:22  Phos  2.3     06-28  Mg     2.2     06-28    TPro  7.1  /  Alb  3.7  /  TBili  1.5<H>  /  DBili  x   /  AST  32  /  ALT  36  /  AlkPhos  73  06-28    PT/INR - ( 27 Jun 2021 14:57 )   PT: 13.0 sec;   INR: 1.13 ratio         PTT - ( 27 Jun 2021 14:57 )  PTT:27.9 sec    CARDIAC MARKERS ( 27 Jun 2021 14:57 )  <.015 ng/mL / x     / x     / x     / x                CT SCAN:     ULTRASOUND:     ASSESSMENT & PLAN:   56 YO male with PMH DM2, HTN, HLD, previous CVA, seizure hx (thought to be from old CVA), presents for near-syncope and AMS today around 1pm while at work.  Per charts, pt had near-syncopal episode at work with noted confusion where patient was unable to speak or understand.   He was brought to the ED by EMS where a code stroke was initiated. Noted to be alert and moving all extremities, but with aphasia as above, no other focal deficits.  CT head showing old left MCA infarct. NIH was 7.  Pt received TPA bolus at 1513. Repeat CT negative , patient unable to have MRI ue to retained metal Hip/ elbow surgical repair in [place]   Patient exhibiting acalculia or difficulty with numbers- unable to count backwards recall dates         PLAN:  ========    NEURO:  -neuro checks per routine   - continue keppra Hx of seizures     -S/P TPA  -start ASA / high dose statin   -PT/OT/ speech   -ECHO -EF 55% no septal defect   PULM:  -maintain O2 > 92%    CV:  -permissive HTN   - resume metoprolol/ norvasc when able   -Maintain MAP > 65  -continue statin     GI:  -advance diet as tolerated     RENAL:  -maintain urine output > 0.5cc/kg/hr     :  PUENTES yes [  ] NO [ X ] insertion date     ID:  -no sign of active infection     ENDO:  -continue sliding scale insulin     HEME:  - enoxaparin Injectable 40 milliGRAM(s)

## 2021-06-28 NOTE — PHARMACOTHERAPY INTERVENTION NOTE - COMMENTS
Per policy, levetiracetam 500 mg IV q12h transitioned to PO route since patient is tolerating a diet and taking medications by mouth.

## 2021-06-28 NOTE — CONSULT NOTE ADULT - SUBJECTIVE AND OBJECTIVE BOX
Neurology consult    JEREMIE PIERCE55yMale     Patient is a 55y old  Male who presents with a chief complaint of acute AMS s/p TPA (27 Jun 2021 15:57)      HPI:  56 YO male with PMH DM2, HTN, HLD, previous CVA, seizure hx (thought to be from old CVA), presents for near-syncope and AMS today around 1pm while at work.  Per charts, pt had near-syncopal episode at work with noted confusion where patient was unable to speak or understand.   He was brought to the ED by EMS where a code stroke was initiated. Noted to be alert and moving all extremities, but with aphasia as above, no other focal deficits.  CT head showing old left MCA infarct. NIH was 7.  Pt received TPA bolus at 1513.  ICU consulted for post-TPA management and care. (27 Jun 2021 15:57)    improving. patient can not clearly say why he is here  REVIEW OF SYSTEMS:    see HPI    MEDICATIONS    aspirin enteric coated 81 milliGRAM(s) Oral daily  atorvastatin 80 milliGRAM(s) Oral at bedtime  chlorhexidine 2% Cloths 1 Application(s) Topical <User Schedule>  dextrose 40% Gel 15 Gram(s) Oral once  dextrose 5%. 1000 milliLiter(s) IV Continuous <Continuous>  dextrose 5%. 1000 milliLiter(s) IV Continuous <Continuous>  dextrose 50% Injectable 25 Gram(s) IV Push once  dextrose 50% Injectable 12.5 Gram(s) IV Push once  dextrose 50% Injectable 25 Gram(s) IV Push once  glucagon  Injectable 1 milliGRAM(s) IntraMuscular once  insulin lispro (ADMELOG) corrective regimen sliding scale   SubCutaneous every 6 hours  levETIRAcetam 500 milliGRAM(s) Oral two times a day      PMH: Seizure    Diabetes    History of ischemic left MCA stroke         PSH: No significant past surgical history        Family history: No history of dementia, strokes, or seizures   FAMILY HISTORY:      SOCIAL HISTORY:  No history of tobacco or alcohol use     Allergies    Allergy Status Unknown    Intolerances        Height (cm): 162.6 (06-27 @ 14:10)  Weight (kg): 91.3 (06-27 @ 18:50)  BMI (kg/m2): 34.5 (06-27 @ 18:50)    Vital Signs Last 24 Hrs  T(C): 36.3 (28 Jun 2021 08:00), Max: 37.6 (27 Jun 2021 18:50)  T(F): 97.4 (28 Jun 2021 08:00), Max: 99.6 (27 Jun 2021 18:50)  HR: 90 (28 Jun 2021 10:00) (63 - 103)  BP: 133/89 (28 Jun 2021 10:00) (106/71 - 145/87)  BP(mean): 101 (28 Jun 2021 10:00) (81 - 109)  RR: 19 (28 Jun 2021 10:00) (16 - 36)  SpO2: 93% (28 Jun 2021 10:00) (87% - 100%)      On Neurological Examination:    Mental Status - Patient is alert, awake, oriented X3. says month wrong age, follows simple commands mild motor aphasia cloese eyes and fist to commands    Cranial Nerves - PERRL, EOMI, right visual extinction V1-V3 intact, subtle right facial, tongue/uvula midline    Motor Exam -   Right upper 5-/5 pronator drift  Left upper no drift  lowers no drift      nml bulk/tone    Sensory    Intact to light touch and pinprick bilaterally    Coord: FTN intact bilaterally     Gait -  deferred        LABS:  CBC Full  -  ( 28 Jun 2021 04:22 )  WBC Count : 6.96 K/uL  RBC Count : 4.46 M/uL  Hemoglobin : 14.0 g/dL  Hematocrit : 40.3 %  Platelet Count - Automated : 213 K/uL  Mean Cell Volume : 90.4 fl  Mean Cell Hemoglobin : 31.4 pg  Mean Cell Hemoglobin Concentration : 34.7 gm/dL  Auto Neutrophil # : x  Auto Lymphocyte # : x  Auto Monocyte # : x  Auto Eosinophil # : x  Auto Basophil # : x  Auto Neutrophil % : x  Auto Lymphocyte % : x  Auto Monocyte % : x  Auto Eosinophil % : x  Auto Basophil % : x      06-28    139  |  107  |  12  ----------------------------<  103<H>  3.3<L>   |  24  |  0.68    Ca    8.4<L>      28 Jun 2021 04:22  Phos  2.3     06-28  Mg     2.2     06-28    TPro  7.1  /  Alb  3.7  /  TBili  1.5<H>  /  DBili  x   /  AST  32  /  ALT  36  /  AlkPhos  73  06-28    LIVER FUNCTIONS - ( 28 Jun 2021 04:22 )  Alb: 3.7 g/dL / Pro: 7.1 gm/dL / ALK PHOS: 73 U/L / ALT: 36 U/L / AST: 32 U/L / GGT: x           Hemoglobin A1C:   Lipid Panel 06-28 @ 08:59  Total Cholesterol, Serum 109  LDL --  Triglycerides 69      PT/INR - ( 27 Jun 2021 14:57 )   PT: 13.0 sec;   INR: 1.13 ratio         PTT - ( 27 Jun 2021 14:57 )  PTT:27.9 sec      RADIOLOGY  CT < from: CT Brain Stroke Protocol (06.27.21 @ 14:15) >  IMPRESSION:    CT brain: There is no acute lobar infarction, intracranial hemorrhage or mass effect. Further correlation with MRI of the brain is suggested for more detailed evaluation to evaluate for an acute on chronic infarction or acute lacunar infarction not detected by CT.    There is chronic infarction along left MCA territory.    CTA brain: There is no large vessel occlusion or aneurysm involving major proximal intracranial arteries.    CTA NECK: There is no stenosis involving major neck arteries.    CT perfusion:Core infarct zone in the left frontal lobe superiorly corresponding to the old ischemic infarct. No true ischemic penumbra is seen.    NASCET CAROTID STENOSIS CRITERIA (distal normal appearing ICA as denominator for measurement): 0%-none, 1-49%-mild,50-70%-moderate, 70-89%-severe, 90-99%-critical.    < end of copied text >

## 2021-06-28 NOTE — DIETITIAN INITIAL EVALUATION ADULT. - PERTINENT MEDS FT
MEDICATIONS  (STANDING):  aspirin enteric coated 81 milliGRAM(s) Oral daily  atorvastatin 80 milliGRAM(s) Oral at bedtime  chlorhexidine 2% Cloths 1 Application(s) Topical <User Schedule>  dextrose 40% Gel 15 Gram(s) Oral once  dextrose 5%. 1000 milliLiter(s) (50 mL/Hr) IV Continuous <Continuous>  dextrose 5%. 1000 milliLiter(s) (100 mL/Hr) IV Continuous <Continuous>  dextrose 50% Injectable 25 Gram(s) IV Push once  dextrose 50% Injectable 12.5 Gram(s) IV Push once  dextrose 50% Injectable 25 Gram(s) IV Push once  glucagon  Injectable 1 milliGRAM(s) IntraMuscular once  insulin lispro (ADMELOG) corrective regimen sliding scale   SubCutaneous every 6 hours  levETIRAcetam 500 milliGRAM(s) Oral two times a day    MEDICATIONS  (PRN):

## 2021-06-28 NOTE — SWALLOW BEDSIDE ASSESSMENT ADULT - H & P REVIEW
· HPI Objective Statement: Pt is a 55 year old male w/PMH HTN, DM, who presents to the ED today for near syncope symptoms while at work. Pt appeared to be looking like he was going to pass out then sat down. EMS noted some confusion by patient. No focal weakness on exam due to acute onset and nature of confusion pt was seen at triage at code for stroke evaluation. Difficulty following commands and answering question properly and in native language of [place],/yes Pt is a 55 year old male w/PMH HTN, DM, who presents to the ED today for near syncope symptoms while at work. Pt appeared to be looking like he was going to pass out then sat down. EMS noted some confusion by patient. No focal weakness on exam due to acute onset and nature of confusion pt was seen at triage at code for stroke evaluation. Difficulty following commands and answering question properly and in native language of Palestinian,/yes

## 2021-06-28 NOTE — DIETITIAN INITIAL EVALUATION ADULT. - ORAL INTAKE PTA/DIET HISTORY
Pt is Samoan speaking, RD used  service to obtain nutrition hx, pt was able to state date of birth, however, pt was not able to provide detailed hx, appeared to have some cognitive deficit.  Pt does understand and speak some English.  Pt's wife did the shopping/cooking PTA.  Diet PTA: regular, not too much sugar, sweets.  As per pt., he ate 2 meals/day to control diabetes and prevent wt. gain

## 2021-06-28 NOTE — PATIENT PROFILE ADULT - NSTRANSFERBELONGINGSDISPO_GEN_A_NUR
pt requested to keep cellphone at bedside / notified pt not responsible for belongings kept at bedside / verbalizes understanding/given to security

## 2021-06-28 NOTE — PATIENT PROFILE ADULT - INTERPRETATION SERVICES DECLINED
staff RN interpreted during assessment / pt stated understands english o give answers/Patient/Caregiver requests family/friend to interpret.

## 2021-06-28 NOTE — PROGRESS NOTE ADULT - ASSESSMENT
55M primarily Mauritian speaking but able to speak and understand some English with PMH DM2, HTN, HLD, prior L MCA CVA, seizure hx on keppra (thought to be from old CVA), presents for near-syncope with confusion and AMS while at work. Patient was unable to speak or understand. He was brought to the ED by EMS where a code stroke was initiated. Noted to be alert and moving all extremities, but with aphasia. NIHSS = 7. s/p TPA bolus at 1513. Admitted to ICU for acute ischemic stroke s/p tpa    DX: ischemic CVA, seizure disorder, HTN, DM    *pt interviewed with  at bedside (nursing aid and emergency medicine resident who are both native Mauritian speaking)    NEURO  no motor deficits  not aphasic but at times with some expressive aphasia: mostly with difficulty answering when it comes to numbers  passed swallow evaluation  repeat CT head without bleed or acute stroke  unable to get MRI as pt with reported left hip and left arm/elbow metallic prosthesis over 20 years ago from prior accident/trauma in [place]  start ASA therapy  start high dose statin therapy atorvastatin 80mg daily  lipid profile and HbA1C as noted  echo and CTA neck noted  PT eval  cont with keppra 500mg twice daily for underlying hx of seizures, no active seizures noted during hospital course thus far  neurology consult    CVS  BP stable  avoid hypotension  can hold home antihypertensives for now as one teens to 130s  with acute ischemic stroke will allow for some permissive HTN in acute setting    PULM  no acute issues  doing well on RA    ENDO  cont sliding scale coverage for elevated blood sugar  DM well controlled overall: HbA1C: 5.7    GEN  OOB to chair  full code  stable for transfer to medical floor  d/w patient         55M primarily Danish speaking but able to speak and understand some English with PMH DM2, HTN, HLD, prior L MCA CVA, seizure hx on keppra (thought to be from old CVA), presents for near-syncope with confusion and AMS while at work. Patient was unable to speak or understand. He was brought to the ED by EMS where a code stroke was initiated. Noted to be alert and moving all extremities, but with aphasia. NIHSS = 7. s/p TPA bolus at 1513. Admitted to ICU for acute ischemic stroke s/p tpa    DX: ischemic CVA, seizure disorder, HTN, DM    *pt interviewed with  at bedside (nursing aid and emergency medicine resident who are both native Danish speaking)    NEURO  no motor deficits  not aphasic but at times with some expressive aphasia: mostly with difficulty answering when it comes to numbers  passed swallow evaluation  repeat CT head without bleed or acute stroke  unable to get MRI as pt with reported left hip and left arm/elbow metallic prosthesis over 20 years ago from prior accident/trauma in [place]  start ASA therapy  start high dose statin therapy atorvastatin 80mg daily  lipid profile and HbA1C as noted  echo and CTA neck noted  PT eval  cont with keppra 500mg twice daily for underlying hx of seizures, no active seizures noted during hospital course thus far  neurology consult    CVS  BP stable  avoid hypotension  can hold home antihypertensives for now as one teens to 130s  with acute ischemic stroke will allow for some permissive HTN in acute setting    PULM  no acute issues  doing well on RA    ENDO  cont sliding scale coverage for elevated blood sugar  DM well controlled overall: HbA1C: 5.7    RENAL  supplement hypokalemia and hypophosphatemia    GEN  OOB to chair  full code  stable for transfer to medical floor  d/w patient

## 2021-06-28 NOTE — DIETITIAN INITIAL EVALUATION ADULT. - PERTINENT LABORATORY DATA
06-28 Na139 mmol/L Glu 103 mg/dL<H> K+ 3.3 mmol/L<L> Cr  0.68 mg/dL BUN 12 mg/dL 06-28 Phos 2.3 mg/dL<L> 06-28 Alb 3.7 g/dL 06-28 Chol 109 mg/dL LDL --    HDL 51 mg/dL Trig 69 mg/dL06-28 ALT 36 U/L AST 32 U/L Alkaline Phosphatase 73 U/L  06-28-21 @ 08:44 a1c 5.7<H-good glycemic control>

## 2021-06-28 NOTE — DIETITIAN INITIAL EVALUATION ADULT. - OTHER CALCULATIONS
IBW: 61.6 kg used for calculation of estimated needs. %IBW: 148%    ? UBW       % UBW: ?. ? adm wt., pt appears <adm wt.

## 2021-06-28 NOTE — SWALLOW BEDSIDE ASSESSMENT ADULT - SLP GENERAL OBSERVATIONS
pt seen bedside alert and oriented x4. pt responded to questions with good accuracy, he verbalized wants/needs and was able to follow one step directions. noted good speech intelligibility

## 2021-06-28 NOTE — DIETITIAN INITIAL EVALUATION ADULT. - FACTORS AFF FOOD INTAKE
06/28, swallow evaluation recommended regular consistency c thin liquids, RN reported that pt is eating well,/none

## 2021-06-29 LAB
ALBUMIN SERPL ELPH-MCNC: 3.8 G/DL — SIGNIFICANT CHANGE UP (ref 3.3–5)
ALP SERPL-CCNC: 79 U/L — SIGNIFICANT CHANGE UP (ref 40–120)
ALT FLD-CCNC: 35 U/L — SIGNIFICANT CHANGE UP (ref 12–78)
ANION GAP SERPL CALC-SCNC: 6 MMOL/L — SIGNIFICANT CHANGE UP (ref 5–17)
AST SERPL-CCNC: 34 U/L — SIGNIFICANT CHANGE UP (ref 15–37)
BILIRUB SERPL-MCNC: 0.8 MG/DL — SIGNIFICANT CHANGE UP (ref 0.2–1.2)
BUN SERPL-MCNC: 13 MG/DL — SIGNIFICANT CHANGE UP (ref 7–23)
CALCIUM SERPL-MCNC: 8.8 MG/DL — SIGNIFICANT CHANGE UP (ref 8.5–10.1)
CHLORIDE SERPL-SCNC: 108 MMOL/L — SIGNIFICANT CHANGE UP (ref 96–108)
CO2 SERPL-SCNC: 27 MMOL/L — SIGNIFICANT CHANGE UP (ref 22–31)
CREAT SERPL-MCNC: 0.7 MG/DL — SIGNIFICANT CHANGE UP (ref 0.5–1.3)
GLUCOSE BLDC GLUCOMTR-MCNC: 118 MG/DL — HIGH (ref 70–99)
GLUCOSE BLDC GLUCOMTR-MCNC: 120 MG/DL — HIGH (ref 70–99)
GLUCOSE BLDC GLUCOMTR-MCNC: 126 MG/DL — HIGH (ref 70–99)
GLUCOSE BLDC GLUCOMTR-MCNC: 89 MG/DL — SIGNIFICANT CHANGE UP (ref 70–99)
GLUCOSE SERPL-MCNC: 113 MG/DL — HIGH (ref 70–99)
HCT VFR BLD CALC: 42 % — SIGNIFICANT CHANGE UP (ref 39–50)
HGB BLD-MCNC: 14.4 G/DL — SIGNIFICANT CHANGE UP (ref 13–17)
MAGNESIUM SERPL-MCNC: 2.3 MG/DL — SIGNIFICANT CHANGE UP (ref 1.6–2.6)
MCHC RBC-ENTMCNC: 31.2 PG — SIGNIFICANT CHANGE UP (ref 27–34)
MCHC RBC-ENTMCNC: 34.3 GM/DL — SIGNIFICANT CHANGE UP (ref 32–36)
MCV RBC AUTO: 90.9 FL — SIGNIFICANT CHANGE UP (ref 80–100)
NRBC # BLD: 0 /100 WBCS — SIGNIFICANT CHANGE UP (ref 0–0)
PHOSPHATE SERPL-MCNC: 2.8 MG/DL — SIGNIFICANT CHANGE UP (ref 2.5–4.5)
PLATELET # BLD AUTO: 225 K/UL — SIGNIFICANT CHANGE UP (ref 150–400)
POTASSIUM SERPL-MCNC: 3.6 MMOL/L — SIGNIFICANT CHANGE UP (ref 3.5–5.3)
POTASSIUM SERPL-SCNC: 3.6 MMOL/L — SIGNIFICANT CHANGE UP (ref 3.5–5.3)
PROT SERPL-MCNC: 6.9 GM/DL — SIGNIFICANT CHANGE UP (ref 6–8.3)
RBC # BLD: 4.62 M/UL — SIGNIFICANT CHANGE UP (ref 4.2–5.8)
RBC # FLD: 13.1 % — SIGNIFICANT CHANGE UP (ref 10.3–14.5)
SODIUM SERPL-SCNC: 141 MMOL/L — SIGNIFICANT CHANGE UP (ref 135–145)
WBC # BLD: 5.35 K/UL — SIGNIFICANT CHANGE UP (ref 3.8–10.5)
WBC # FLD AUTO: 5.35 K/UL — SIGNIFICANT CHANGE UP (ref 3.8–10.5)

## 2021-06-29 PROCEDURE — 99232 SBSQ HOSP IP/OBS MODERATE 35: CPT

## 2021-06-29 RX ORDER — POTASSIUM CHLORIDE 20 MEQ
40 PACKET (EA) ORAL ONCE
Refills: 0 | Status: COMPLETED | OUTPATIENT
Start: 2021-06-29 | End: 2021-06-29

## 2021-06-29 RX ADMIN — Medication 81 MILLIGRAM(S): at 17:22

## 2021-06-29 RX ADMIN — ATORVASTATIN CALCIUM 80 MILLIGRAM(S): 80 TABLET, FILM COATED ORAL at 21:07

## 2021-06-29 RX ADMIN — ENOXAPARIN SODIUM 40 MILLIGRAM(S): 100 INJECTION SUBCUTANEOUS at 12:09

## 2021-06-29 RX ADMIN — CHLORHEXIDINE GLUCONATE 1 APPLICATION(S): 213 SOLUTION TOPICAL at 05:08

## 2021-06-29 RX ADMIN — LEVETIRACETAM 500 MILLIGRAM(S): 250 TABLET, FILM COATED ORAL at 17:22

## 2021-06-29 RX ADMIN — Medication 40 MILLIEQUIVALENT(S): at 12:05

## 2021-06-29 RX ADMIN — LEVETIRACETAM 500 MILLIGRAM(S): 250 TABLET, FILM COATED ORAL at 05:08

## 2021-06-29 NOTE — OCCUPATIONAL THERAPY INITIAL EVALUATION ADULT - ORIENTATION, REHAB EVAL
(required multiple choice answers to respond to questions due to aphasia)/oriented to person, place, time and situation

## 2021-06-29 NOTE — OCCUPATIONAL THERAPY INITIAL EVALUATION ADULT - PERTINENT HX OF CURRENT PROBLEM, REHAB EVAL
Pt admitted from ED with c/o near syncope & global aphasia (witnessed by co workers). Pt has PMH left MCA infarct,. CT Head "large old left MCA infarct. No acute abnormality or hemorrhage suggested." Pt admitted from ED with c/o near syncope & global aphasia (witnessed by co workers). Pt is s/p TPA on 6/27. Pt has PMH left MCA infarct,. CT Head "large old left MCA infarct. No acute abnormality or hemorrhage suggested."

## 2021-06-29 NOTE — PHYSICAL THERAPY INITIAL EVALUATION ADULT - PATIENT/FAMILY/SIGNIFICANT OTHER GOALS STATEMENT, PT EVAL
Faxed back    
Reason for Call:  Form, our goal is to have forms completed with 72 hours, however, some forms may require a visit or additional information.    Type of letter, form or note:  medical    Who is the form from?: Home care    Where did the form come from: form was faxed in    What clinic location was the form placed at?: [facility]    Where the form was placed: 's Box: Nathan Dhillon MD    What number is listed as a contact on the form?: [phone] Phone  [phone]       Additional comments: joey diabetic test strips    Call taken on 7/24/2018 at 3:13 PM by Oralia Connelly        
Patient wants to maintain his prior level of function - being independent in all mobility without AD

## 2021-06-29 NOTE — PROGRESS NOTE ADULT - ASSESSMENT
56 yo Jordanian speaking M w/ hx of DM2, HTN, HLD, previous CVA, seizure hx (thought to be from old CVA), presented w/ near-syncope and AMS & was admitted w/ CVA w/ dysarthria, . He was given tPA & admitted to the ICU for observation on 6/27 & transferred to the medical floor the following day.    CVA  - symptoms maybe due to prior VCA or old TBI that the patient says he had in [place]  - status post tPA  - repeat CT head showed a large old left MCA infarct w/ no acute abnormality or hemorrhage  - CTA brain showed no large vessel occlusion or aneurysm involving major proximal intracranial arteries  - CTA neck showed no stenosis involving major neck arteries  - c/w ASA & Lipitor  - TTE showed EF 55 to 60%  - patient unable to have MRI due to retained metal Hip/ elbow surgical repair in [place]   - neurology note read and appreciated     Seizures  - c/w Keppra    DM 2  - Hgb A1C is 5.7  - c/w ISS    Prophylaxis:  DVT: Lovenox  GI: PO diet 54 yo Cypriot speaking M w/ hx of DM2, HTN, HLD, previous CVA, seizure hx (thought to be from old CVA), presented w/ near-syncope and AMS & was admitted w/ CVA w/ dysarthria, . He was given tPA & admitted to the ICU for observation on 6/27 & transferred to the medical floor the following day.    CVA  - symptoms maybe due to prior VCA or old TBI that the patient says he had in [place]  - status post tPA  - repeat CT head showed a large old left MCA infarct w/ no acute abnormality or hemorrhage  - CTA brain showed no large vessel occlusion or aneurysm involving major proximal intracranial arteries  - CTA neck showed no stenosis involving major neck arteries  - c/w ASA & Lipitor  - TTE showed EF 55 to 60%  - patient unable to have MRI due to retained metal Hip/ elbow surgical repair in [place]   - neurology note read and appreciated     Seizures  - c/w Keppra    DM 2  - Hgb A1C is 5.7  - c/w ISS    Prophylaxis:  DVT: Lovenox  GI: PO diet    As per OT/Pt, patient will need neuro-rehab as he has right hand dysmetria and disdiakinesis- will need to be arranged by /SW prior to discharge tomorrow.

## 2021-06-29 NOTE — PHYSICAL THERAPY INITIAL EVALUATION ADULT - ACTIVE RANGE OF MOTION EXAMINATION, REHAB EVAL
margaux. upper extremity Active ROM was WNL (within normal limits)/bilateral lower extremity Active ROM was WNL (within normal limits)

## 2021-06-29 NOTE — OCCUPATIONAL THERAPY INITIAL EVALUATION ADULT - COGNITIVE, VISUAL PERCEPTUAL, OT EVAL
Pt will identify 10/10 places/objects independently to improve expressive aphasia & increase performance with IADLs in 4 weeks.

## 2021-06-29 NOTE — OCCUPATIONAL THERAPY INITIAL EVALUATION ADULT - MOTOR COORDINATION TRAINING, PT EVAL
Pt will perform palm/tip translation of 5/5 small objects with right UE to increase coordination & performance with IADLs

## 2021-06-29 NOTE — PHYSICAL THERAPY INITIAL EVALUATION ADULT - ORIENTATION, REHAB EVAL
needs choices/verbal cues for eliciting response due to word finding difficulty/oriented to person, place, time and situation

## 2021-06-29 NOTE — PHYSICAL THERAPY INITIAL EVALUATION ADULT - MOTOR COORDINATION TRAINING, PT EVAL
Patient will negotiate a multi step obstacle course (eg balace beam, ramp, curb , stairs, climbing equipment ) for 5 reps with  no LOB  independently.

## 2021-06-29 NOTE — PHYSICAL THERAPY INITIAL EVALUATION ADULT - STRENGTHENING, PT EVAL
Patient will improve strength by 1/2 grade in 6-8 weeks to improve overall functional mobility including gait, transfers, bed mobility and decrease risk of falls.

## 2021-06-29 NOTE — OCCUPATIONAL THERAPY INITIAL EVALUATION ADULT - LEVEL OF CONSCIOUSNESS, OT EVAL
(Pt noted with mild expressive aphasia, pt able to identify 3/4 common objects, pt required multiple choice answers for orientation questions)/alert

## 2021-06-29 NOTE — PHYSICAL THERAPY INITIAL EVALUATION ADULT - ADDITIONAL COMMENTS
Pt live sin [facility] apt with a partner . There are two steps to come in building with no handrail and 1 flight of stairs to [facility] apartment. PTA pt worked as  .

## 2021-06-29 NOTE — PROGRESS NOTE ADULT - ASSESSMENT
PT/ Occupational Therapymale with PMH DM2, HTN, HLD, previous L MCA territory  CVA,here with near syncope and aphasia, s/p TPA. Neuro exam with mild aphasia subtle right facial, right visual extinction, right Ue pronator drift CtH with chronic Left sided CVA. CTA reviewed with no acute findings NIHSS 5    Impression: CVA vs recrudescence of prior CVA      MRI brain w/o cont  Repeat Head CT stable on Aspirin 81  Atorvastatin 80 mg  TTE  Telemetry monitoring   HgA1c  Lipid profile  PT/ OT   54 yo male with PMH DM2, HTN, HLD, previous L MCA territory  CVA,here with near syncope and aphasia, s/p TPA. Neuro exam with mild aphasia subtle right facial, right visual extinction, right Ue pronator drift CtH with chronic Left sided CVA. CTA reviewed with no acute findings NIHSS 5    Impression: CVA vs recrudescence of prior CVA      MRI brain w/o cont  Repeat Head CT stable on Aspirin 81  Atorvastatin 80 mg  TTE  Telemetry monitoring   HgA1c  Lipid profile  PT/ OT

## 2021-06-29 NOTE — OCCUPATIONAL THERAPY INITIAL EVALUATION ADULT - GENERAL OBSERVATIONS, REHAB EVAL
Pt was encountered supine in bed; NAD, AXOX4 (pt required multiple choice answers for place & time due to mild expressive aphasia), Language Line Lillian #[id number] provided [place] translation. Pt reports he feels he is at his baseline function, pt reports PMH of rt hand coordination impairments x several years ago.

## 2021-06-29 NOTE — PHYSICAL THERAPY INITIAL EVALUATION ADULT - TRANSFER TRAINING, PT EVAL
Patient will transition to the floor by shifting his weight forward and utilizing UE for support with decreasing physical prompts.

## 2021-06-29 NOTE — OCCUPATIONAL THERAPY INITIAL EVALUATION ADULT - ADDITIONAL COMMENTS
Pt reports he lives with partner in apartment with 2 steps to enter building & elevator access to second floor. Pt reports he has old right UE impairments from several years ago that impact his right UE coordination. Pt reports he works as a  and is independent with ADLs and mobility prior to admission.

## 2021-06-30 VITALS
DIASTOLIC BLOOD PRESSURE: 70 MMHG | HEART RATE: 66 BPM | SYSTOLIC BLOOD PRESSURE: 112 MMHG | RESPIRATION RATE: 18 BRPM | OXYGEN SATURATION: 96 % | TEMPERATURE: 98 F

## 2021-06-30 LAB
GLUCOSE BLDC GLUCOMTR-MCNC: 101 MG/DL — HIGH (ref 70–99)
GLUCOSE BLDC GLUCOMTR-MCNC: 104 MG/DL — HIGH (ref 70–99)
GLUCOSE BLDC GLUCOMTR-MCNC: 123 MG/DL — HIGH (ref 70–99)

## 2021-06-30 PROCEDURE — 99239 HOSP IP/OBS DSCHRG MGMT >30: CPT

## 2021-06-30 RX ADMIN — CHLORHEXIDINE GLUCONATE 1 APPLICATION(S): 213 SOLUTION TOPICAL at 05:35

## 2021-06-30 RX ADMIN — ENOXAPARIN SODIUM 40 MILLIGRAM(S): 100 INJECTION SUBCUTANEOUS at 11:32

## 2021-06-30 RX ADMIN — LEVETIRACETAM 500 MILLIGRAM(S): 250 TABLET, FILM COATED ORAL at 05:35

## 2021-06-30 NOTE — DISCHARGE NOTE PROVIDER - CARE PROVIDER_API CALL
YOur PCP in a week,   Phone: (   )    -  Fax: (   )    -  Follow Up Time:     Bladimir Vidales)  Neurology  [address], [address]  [place] [address]  Phone: [phone]  Fax: [phone]  Follow Up Time: 1 month

## 2021-06-30 NOTE — DISCHARGE NOTE PROVIDER - NSDCFUADDINST_GEN_ALL_CORE_FT
1) It is important to see your primary physician as well as other necessary consultants within the next week to perform a comprehensive medical review.  Call their offices for an appointment as soon as you leave the hospital.  If you do not have a primary physician or unable to reach your PCP, contact the [facility] Physician Referral Service at [phone].  Your medical issues appear to be stable at this time, but if your symptoms recur or worsen, contact your physicians and/or return to the hospital if necessary.  If you encounter any issues or questions with your medication, call your physicians before stopping the medication.     2) check blood pressure at home and keep a log for PCP. Goal -130. Hold amlodipine if SBP < 100 and follow up with your PCP

## 2021-06-30 NOTE — PROGRESS NOTE ADULT - SUBJECTIVE AND OBJECTIVE BOX
56 yo M w/ hx of DM2, HTN, HLD, previous CVA, seizure hx (thought to be from old CVA), presented w/ near-syncope and AMS & was admitted w/ CVA w/ dysarthria, . He was given tPA & admitted to the ICU for observation on 6/27 & transferred to the medical floor the following day. He is lying in bed in NAD.    MEDICATIONS  (STANDING):  aspirin enteric coated 81 milliGRAM(s) Oral daily  atorvastatin 80 milliGRAM(s) Oral at bedtime  chlorhexidine 2% Cloths 1 Application(s) Topical <User Schedule>  dextrose 40% Gel 15 Gram(s) Oral once  dextrose 5%. 1000 milliLiter(s) (50 mL/Hr) IV Continuous <Continuous>  dextrose 5%. 1000 milliLiter(s) (100 mL/Hr) IV Continuous <Continuous>  dextrose 50% Injectable 25 Gram(s) IV Push once  dextrose 50% Injectable 12.5 Gram(s) IV Push once  dextrose 50% Injectable 25 Gram(s) IV Push once  enoxaparin Injectable 40 milliGRAM(s) SubCutaneous daily  glucagon  Injectable 1 milliGRAM(s) IntraMuscular once  glucagon  Injectable 1 milliGRAM(s) IntraMuscular once  insulin lispro (ADMELOG) corrective regimen sliding scale   SubCutaneous three times a day before meals  insulin lispro (ADMELOG) corrective regimen sliding scale   SubCutaneous at bedtime  levETIRAcetam 500 milliGRAM(s) Oral two times a day    MEDICATIONS  (PRN):      Allergies    Allergy Status Unknown    Intolerances        Vital Signs Last 24 Hrs  T(C): 37.1 (29 Jun 2021 16:14), Max: 37.1 (29 Jun 2021 16:14)  T(F): 98.8 (29 Jun 2021 16:14), Max: 98.8 (29 Jun 2021 16:14)  HR: 76 (29 Jun 2021 16:14) (60 - 89)  BP: 133/88 (29 Jun 2021 16:14) (110/66 - 133/93)  BP(mean): 79 (28 Jun 2021 22:00) (79 - 79)  RR: 18 (29 Jun 2021 16:14) (18 - 19)  SpO2: 95% (29 Jun 2021 16:14) (94% - 97%)    PHYSICAL EXAM:  GENERAL: NAD, well-groomed, well-developed  HEAD:  Atraumatic, Normocephalic  EYES: EOMI, PERRLA   NECK: Supple   NERVOUS SYSTEM:  Alert & Oriented X3, dysarthric   CHEST/LUNG: Clear to auscultation bilaterally; No rales, rhonchi, wheezing, or rubs  HEART: Regular rate and rhythm; No murmurs, rubs, or gallops  ABDOMEN: Soft, Nontender, Nondistended; Bowel sounds present  EXTREMITIES:  No clubbing, cyanosis, or edema       LABS:                        14.4   5.35  )-----------( 225      ( 29 Jun 2021 07:17 )             42.0     06-29    141  |  108  |  13  ----------------------------<  113<H>  3.6   |  27  |  0.70    Ca    8.8      29 Jun 2021 07:17  Phos  2.8     06-29  Mg     2.3     06-29    TPro  6.9  /  Alb  3.8  /  TBili  0.8  /  DBili  x   /  AST  34  /  ALT  35  /  AlkPhos  79  06-29        CAPILLARY BLOOD GLUCOSE      POCT Blood Glucose.: 126 mg/dL (29 Jun 2021 21:06)  POCT Blood Glucose.: 118 mg/dL (29 Jun 2021 16:24)  POCT Blood Glucose.: 89 mg/dL (29 Jun 2021 11:34)  POCT Blood Glucose.: 120 mg/dL (29 Jun 2021 08:14)      RADIOLOGY & ADDITIONAL TESTS:    06-28-21 @ 07:01  -  06-29-21 @ 07:00  --------------------------------------------------------  IN:    Oral Fluid: 950 mL  Total IN: 950 mL    OUT:    Voided (mL): 2450 mL  Total OUT: 2450 mL    Total NET: -1500 mL      
24 hr events:  no acute events overnight  s/p TPA yesterday afternoon   passed swallow kamron  able to follow commands, speak, but has difficulty particularly with numbers: stating what year it is, his birthday, his address    ## ROS:  no fever, no chills, no fatigue  no HA, no dizziness  no changes in vision  no sore throat  no SOB, no cough  no CP, no palpitations  no abdominal pain, no nausea, no emesis, no diarrhea  no urinary frequency or dysuria  no muscle weakness, no myalgias, no arthralgias  no joint swelling  no paresthesia or radiculopathy  ROS otherwise negative     ## Labs:  CBC:                        14.0   6.96  )-----------( 213      ( 28 Jun 2021 04:22 )             40.3     Chem:  06-28    139  |  107  |  12  ----------------------------<  103<H>  3.3<L>   |  24  |  0.68    Ca    8.4<L>      28 Jun 2021 04:22  Phos  2.3     06-28  Mg     2.2     06-28    TPro  7.1  /  Alb  3.7  /  TBili  1.5<H>  /  AST  32  /  ALT  36  /  AlkPhos  73  06-28    Coags:  PT/INR - ( 27 Jun 2021 14:57 )   PT: 13.0 sec;   INR: 1.13 ratio    PTT - ( 27 Jun 2021 14:57 )  PTT:27.9 sec    A1C with Estimated Average Glucose (06.28.21 @ 08:44)    A1C with Estimated Average Glucose Result: 5.7 mg/dL    Lipid Profile in AM (06.28.21 @ 08:59)    Cholesterol, Serum: 109 mg/dL    Triglycerides, Serum: 69 mg/dL    HDL Cholesterol, Serum: 51 mg/dL    Non HDL Cholesterol: 58 mg/dL    LDL Cholesterol Calculated: 44 mg/dL        ## Imaging:  CT head < from: CT Head No Cont (06.28.21 @ 15:14) >  Again noted is gliosis and encephalomalacia in the left hemisphere corresponding to a large old left MCA infarct. No acute infarct or hemorrhagic lesion is suggested.     CTA head/neck < from: CT Angio Neck w/ IV Cont (06.27.21 @ 14:30) >  CT brain: There is no acute lobar infarction, intracranial hemorrhage or mass effect. Further correlation with MRI of the brain is suggested for more detailed evaluation to evaluate for an acute on chronic infarction or acute lacunar infarction not detected by CT.  There is chronic infarction along left MCA territory.  CTA brain: There is no large vessel occlusion or aneurysm involving major proximal intracranial arteries.  CTA NECK: There is no stenosis involving major neck arteries.  CT perfusion:Core infarct zone in the left frontal lobe superiorly corresponding to the old ischemic infarct. No true ischemic penumbra is seen.      ECHO< from: TTE Echo Complete w/o Contrast w/ Doppler (06.28.21 @ 10:29) >   1. Left ventricular ejection fraction, by visual estimation, is 55 to 60%.   2. Normal global left ventricular systolic function.   3. No evidence of mitral valve regurgitation.   4. Mild tricuspid regurgitation.      ## Medications:  atorvastatin 80 milliGRAM(s) Oral at bedtime  dextrose 40% Gel 15 Gram(s) Oral once  dextrose 50% Injectable 25 Gram(s) IV Push once  dextrose 50% Injectable 12.5 Gram(s) IV Push once  dextrose 50% Injectable 25 Gram(s) IV Push once  glucagon  Injectable 1 milliGRAM(s) IntraMuscular once  glucagon  Injectable 1 milliGRAM(s) IntraMuscular once  insulin lispro (ADMELOG) corrective regimen sliding scale   SubCutaneous three times a day before meals  insulin lispro (ADMELOG) corrective regimen sliding scale   SubCutaneous at bedtime    aspirin enteric coated 81 milliGRAM(s) Oral daily  enoxaparin Injectable 40 milliGRAM(s) SubCutaneous daily      levETIRAcetam 500 milliGRAM(s) Oral two times a day      ## Vitals:  T(C): 37.1 (06-28-21 @ 15:32), Max: 37.1 (06-28-21 @ 15:32)  HR: 74 (06-28-21 @ 18:00) (63 - 101)  BP: 112/75 (06-28-21 @ 18:00) (106/71 - 141/93)  BP(mean): 88 (06-28-21 @ 18:00) (81 - 109)  RR: 17 (06-28-21 @ 18:00) (16 - 36)  SpO2: 95% (06-28-21 @ 18:00) (87% - 98%)      06-27 @ 07:01 - 06-28 @ 07:00  --------------------------------------------------------  IN: 750 mL / OUT: 1300 mL / NET: -550 mL    06-28 @ 07:01 - 06-28 @ 19:04  --------------------------------------------------------  IN: 850 mL / OUT: 1250 mL / NET: -400 mL          ## P/E:  Gen: lying comfortably in bed in no apparent distress  HEENT: PERRL, EOMI  Resp: CTA B/L no rales, no wheeze, no rhonchi  CVS: RRR  Abd: soft NT/ND +BS  Ext: no c/c/e  Neuro: A&Ox3, no motor deficits, able to follow commands both in english and Urdu, has difficulty stating things that require numerical recollection ie unable to state address but can state town and street name of residence, unable to state year of birth but can state the correct month, unable to state current year but can state own name and oriented to place.     CENTRAL LINE: [ ] YES [x ] NO  LOCATION:   DATE INSERTED:  REMOVE: [ ] YES [ ] NO      PUENTES: [ ] YES [x ] NO    DATE INSERTED:  REMOVE:  [ ] YES [ ] NO      A-LINE:  [ ] YES [x ] NO  LOCATION:   DATE INSERTED:  REMOVE:  [ ] YES [ ] NO  EXPLAIN:    GLOBAL ISSUE/BEST PRACTICE:  Analgesia: n/a  Sedation: n/a  HOB elevation: yes  Stress ulcer prophylaxis: n/a  VTE prophylaxis: bilateral compression devices, lovenox  Oral Care: n/a  Glycemic control: sliding scale coverage  Nutrition: po diet, passed dysphagia screen    CODE STATUS: [x ] full code  [ ] DNR  [ ] DNI  [ ] MOLST  Goals of care discussion: [ ] yes 
Neurology Follow up note    Name  JEREMIE PIERCE    HPI:  54 YO male with PMH DM2, HTN, HLD, previous CVA, seizure hx (thought to be from old CVA), presents for near-syncope and AMS today around 1pm while at work.  Per charts, pt had near-syncopal episode at work with noted confusion where patient was unable to speak or understand.   He was brought to the ED by EMS where a code stroke was initiated. Noted to be alert and moving all extremities, but with aphasia as above, no other focal deficits.  CT head showing old left MCA infarct. NIH was 7.  Pt received TPA bolus at 1513.  ICU consulted for post-TPA management and care. (27 Jun 2021 15:57)      Interval History - Patient seen and examined this am.             Vital Signs Last 24 Hrs  T(C): 36.9 (29 Jun 2021 04:53), Max: 37.1 (28 Jun 2021 15:32)  T(F): 98.5 (29 Jun 2021 04:53), Max: 98.8 (28 Jun 2021 15:32)  HR: 73 (29 Jun 2021 04:53) (60 - 94)  BP: 127/81 (29 Jun 2021 04:53) (110/66 - 134/75)  BP(mean): 79 (28 Jun 2021 22:00) (79 - 92)  RR: 18 (29 Jun 2021 04:53) (16 - 29)  SpO2: 97% (29 Jun 2021 04:53) (93% - 97%)    PHYSICAL EXAM:    General: Well developed; well nourished; in no acute distress  Head: Normocephalic; atraumatic  ENMT: No nasal discharge; airway clear  Neck: Supple; non tender; no masses  Breasts: Soft, nontender; no masses  Respiratory: No wheezes, rales or rhonchi  Cardiovascular: Regular rate and rhythm. S1 and S2 Normal; No murmurs, gallops or rubs  Gastrointestinal: Soft non-tender non-distended; Normal bowel sounds; No hepatosplenomegaly  Genitourinary: No costovertebral angle tenderness  Rectal: No masses or lesions  Extremities: Normal range of motion, No clubbing, cyanosis or edema  Vascular: Peripheral pulses palpable 2+ bilaterally  Skin: Warm and dry. No acute rash  Lymph Nodes: No acute cervical adenopathy  Psychiatric: Cooperative and appropriate      Neurological Exam:  Mental Status - Patient is alert, awake, oriented X3. says month wrong age, follows simple commands mild motor aphasia cloese eyes and fist to commands    Cranial Nerves - PERRL, EOMI, right visual extinction V1-V3 intact, subtle right facial, tongue/uvula midline    Motor Exam -   Right upper 5-/5 pronator drift  Left upper no drift  lowers no drift      nml bulk/tone    Sensory    Intact to light touch and pinprick bilaterally    Coord: FTN intact bilaterally     Medications  aspirin enteric coated 81 milliGRAM(s) Oral daily  atorvastatin 80 milliGRAM(s) Oral at bedtime  chlorhexidine 2% Cloths 1 Application(s) Topical <User Schedule>  dextrose 40% Gel 15 Gram(s) Oral once  dextrose 5%. 1000 milliLiter(s) IV Continuous <Continuous>  dextrose 5%. 1000 milliLiter(s) IV Continuous <Continuous>  dextrose 50% Injectable 25 Gram(s) IV Push once  dextrose 50% Injectable 12.5 Gram(s) IV Push once  dextrose 50% Injectable 25 Gram(s) IV Push once  enoxaparin Injectable 40 milliGRAM(s) SubCutaneous daily  glucagon  Injectable 1 milliGRAM(s) IntraMuscular once  glucagon  Injectable 1 milliGRAM(s) IntraMuscular once  insulin lispro (ADMELOG) corrective regimen sliding scale   SubCutaneous three times a day before meals  insulin lispro (ADMELOG) corrective regimen sliding scale   SubCutaneous at bedtime  levETIRAcetam 500 milliGRAM(s) Oral two times a day  potassium chloride    Tablet ER 40 milliEquivalent(s) Oral once      Lab      Radiology    
Neurology Follow up note    Name  JEREMIE PIERCE    HPI:  54 YO male with PMH DM2, HTN, HLD, previous CVA, seizure hx (thought to be from old CVA), presents for near-syncope and AMS today around 1pm while at work.  Per charts, pt had near-syncopal episode at work with noted confusion where patient was unable to speak or understand.   He was brought to the ED by EMS where a code stroke was initiated. Noted to be alert and moving all extremities, but with aphasia as above, no other focal deficits.  CT head showing old left MCA infarct. NIH was 7.  Pt received TPA bolus at 1513.  ICU consulted for post-TPA management and care. (27 Jun 2021 15:57)      Interval History - Patient seen and examined this am.         Vital Signs Last 24 Hrs  T(C): 36.2 (30 Jun 2021 05:20), Max: 37.1 (29 Jun 2021 16:14)  T(F): 97.2 (30 Jun 2021 05:20), Max: 98.8 (29 Jun 2021 16:14)  HR: 66 (30 Jun 2021 05:20) (54 - 89)  BP: 113/79 (30 Jun 2021 05:20) (113/79 - 133/93)  BP(mean): --  RR: 18 (30 Jun 2021 05:20) (18 - 18)  SpO2: 95% (30 Jun 2021 05:20) (94% - 96%)    PHYSICAL EXAM:        Neurological Exam:  Mental Status - Patient is alert, awake, oriented X3. says month wrong age, follows simple commands mild motor aphasia cloese eyes and fist to commands    Cranial Nerves - PERRL, EOMI, right visual extinction V1-V3 intact, subtle right facial, tongue/uvula midline    Motor Exam -   Right upper 5-/5 pronator drift  Left upper no drift  lowers no drift      nml bulk/tone    Sensory    Intact to light touch and pinprick bilaterally    Coord: FTN intact bilaterally     MEDICATIONS  (STANDING):  aspirin enteric coated 81 milliGRAM(s) Oral daily  atorvastatin 80 milliGRAM(s) Oral at bedtime  chlorhexidine 2% Cloths 1 Application(s) Topical <User Schedule>  dextrose 40% Gel 15 Gram(s) Oral once  dextrose 5%. 1000 milliLiter(s) (50 mL/Hr) IV Continuous <Continuous>  dextrose 5%. 1000 milliLiter(s) (100 mL/Hr) IV Continuous <Continuous>  dextrose 50% Injectable 25 Gram(s) IV Push once  dextrose 50% Injectable 12.5 Gram(s) IV Push once  dextrose 50% Injectable 25 Gram(s) IV Push once  enoxaparin Injectable 40 milliGRAM(s) SubCutaneous daily  glucagon  Injectable 1 milliGRAM(s) IntraMuscular once  glucagon  Injectable 1 milliGRAM(s) IntraMuscular once  insulin lispro (ADMELOG) corrective regimen sliding scale   SubCutaneous three times a day before meals  insulin lispro (ADMELOG) corrective regimen sliding scale   SubCutaneous at bedtime  levETIRAcetam 500 milliGRAM(s) Oral two times a day    MEDICATIONS  (PRN):        Lab      Radiology

## 2021-06-30 NOTE — PROGRESS NOTE ADULT - ASSESSMENT
56 yo male with PMH DM2, HTN, HLD, previous L MCA territory  CVA,here with near syncope and aphasia, s/p TPA. Neuro exam with mild aphasia subtle right facial, right visual extinction, right Ue pronator drift CtH with chronic Left sided CVA. CTA reviewed with no acute findings NIHSS 5    Impression: CVA vs recrudescence of prior CVA      MRI brain w/o cont pending  Repeat Head CT stable on Aspirin 81  Atorvastatin 80 mg  TTE reviewed  Telemetry monitoring   PT/ OT

## 2021-06-30 NOTE — DISCHARGE NOTE PROVIDER - PROVIDER TOKENS
FREE:[LAST:[YOur PCP in a week],PHONE:[(   )    -],FAX:[(   )    -]],PROVIDER:[TOKEN:[[id number]],FOLLOWUP:[1 month]]

## 2021-06-30 NOTE — DISCHARGE NOTE NURSING/CASE MANAGEMENT/SOCIAL WORK - PATIENT PORTAL LINK FT
You can access the FollowMyHealth Patient Portal offered by [facility] by registering at the following website: [email or web address]. By joining [employer]’s FollowMyHealth portal, you will also be able to view your health information using other applications (apps) compatible with our system.

## 2021-06-30 NOTE — DISCHARGE NOTE PROVIDER - HOSPITAL COURSE
56 yo male with PMH DM2, HTN, HLD, previous L MCA territory  CVA,here with near syncope and aphasia, s/p TPA. Neuro exam with mild aphasia subtle right facial, right visual extinction, right Ue pronator drift CtH with chronic Left sided CVA. CTA reviewed with no acute findings NIHSS 5     Patient remained stable neurologically and was discharged home with outpatient neuro-rehab prescription.     Impression: CVA vs recrudescence of prior CVA  - status post tPA  - repeat CT head showed a large old left MCA infarct w/ no acute abnormality or hemorrhage  - CTA brain showed no large vessel occlusion or aneurysm involving major proximal intracranial arteries  - CTA neck showed no stenosis involving major neck arteries  - c/w ASA & Lipitor  - TTE showed EF 55 to 60%  - patient unable to have MRI due to retained metal Hip/ elbow surgical repair in [place]   - neurology recs reviewed.   -PT recommended outpatient neuro rehab.     Seizures  - c/w Keppra    DM 2  - Hgb A1C is 5.7. Diabetic education provided.     Seen and examined by me today. Vitals stable.   I have discussed all the inpatient radiographic findings with the patient and stressed that patient follows with the PCP for further outpatient care. I have printed inpatient lab results and report of imaging studies and given these to the patient to help with further outpatient care with PCP.   All questions welcomed and answered appropriately. Patient verbalized understanding of post discharge physician's follows up and discharge instructions.   DC time spent by me excluding billable procedures 38 mins   54 yo male with PMH DM2, HTN, HLD, previous L MCA territory  CVA,here with near syncope and aphasia, s/p TPA. Neuro exam with mild aphasia subtle right facial, right visual extinction, right Ue pronator drift CtH with chronic Left sided CVA. CTA reviewed with no acute findings NIHSS 5     Patient remained stable neurologically and was discharged home with outpatient neuro-rehab prescription.     Impression:   CVA vs recrudescence of prior CVA (unable to determine clinically the specific etiology of patient's symptoms)  - status post tPA  - repeat CT head showed a large old left MCA infarct w/ no acute abnormality or hemorrhage  - CTA brain showed no large vessel occlusion or aneurysm involving major proximal intracranial arteries  - CTA neck showed no stenosis involving major neck arteries  - c/w ASA & Lipitor  - TTE showed EF 55 to 60%  - patient unable to have MRI due to retained metal Hip/ elbow surgical repair in [place]   - neurology recs reviewed.   -PT recommended outpatient neuro rehab.     Seizures  - c/w Keppra    DM 2  - Hgb A1C is 5.7. Diabetic education provided.     Seen and examined by me today. Vitals stable.   I have discussed all the inpatient radiographic findings with the patient and stressed that patient follows with the PCP for further outpatient care. I have printed inpatient lab results and report of imaging studies and given these to the patient to help with further outpatient care with PCP.   All questions welcomed and answered appropriately. Patient verbalized understanding of post discharge physician's follows up and discharge instructions.   DC time spent by me excluding billable procedures 38 mins

## 2021-06-30 NOTE — DISCHARGE NOTE PROVIDER - NSDCMRMEDTOKEN_GEN_ALL_CORE_FT
amLODIPine 10 mg oral tablet: 1 tab(s) orally once a day  aspirin 81 mg oral delayed release tablet: 1 tab(s) orally once a day  atorvastatin 40 mg oral tablet: 1 tab(s) orally once a day (at bedtime)  Keppra 500 mg oral tablet: 1 tab(s) orally 2 times a day   PT: Neuro-rehab outpatient for CVA.   2-3 x /week x 4-6 weeks

## 2023-07-31 NOTE — PATIENT PROFILE ADULT - NSPROPTRIGHTNOTIFY_GEN_A_NUR
"Physical Therapy Daily Treatment Note     Name: Deb Webb  Clinic Number: [id number]    Therapy Diagnosis:   Encounter Diagnoses   Name Primary?    Chronic pain of both knees Yes    Weakness of both lower extremities     Gait abnormality      Physician: Blas Barraza MD    Visit Date: 7/31/2023    Physician Orders: PT Eval and Treat bilat knee OA  Medical Diagnosis from Referral:   M17.11 (ICD-10-CM) - Osteoarthritis of right knee   M17.12 (ICD-10-CM) - Osteoarthritis of left knee      Evaluation Date: 7/17/2023  Authorization Period Expiration: 8/23/2023  Plan of Care Expiration: 9/25/2023  Progress Note Due: 8/16/2023  Visit # / Visits authorized: 1/ 24 +Eval  FOTO: 1/3      Time In: 10:55 am  Time Out: 11:50  Total Billable Time: 50 minutes    Precautions: Standard, Diabetes, and see PMHx      Subjective     Pt reports: her knees feel "so-so today". She reports she was not compliant with HEP since initial evaluation due to death in the family.    She was not compliant with home exercise program.  Response to previous treatment: increase in muscular soreness  Functional change: None yet    Pain: 7/10  Location: bilateral knee      Objective     Range of Motion:   Knee Right Left   Active 0-132 0-130      Lower Extremity Strength  Right LE   Left LE     Knee extension: 4-/5 Knee extension: 4-/5   Knee flexion: 3+/5 Knee flexion: 3+/5   Hip flexion: 3+/5 Hip flexion: 3+/5   Hip extension:  NP 2/2 patient unable to lie prone Hip extension: NP 2/2 patient unable to lie prone   Hip abduction: 4-/5 Hip abduction: 4-/5   Hip adduction: 4-/5 Hip adduction 4-/5        Treatment      Deb received therapeutic exercises to develop strength, endurance, ROM, flexibility, posture and core stabilization for 50 minutes including:    Recumbent bike x5 minutes level 1  BLE quad sets x15 c 5 sec hold  +Hamstring stretch 3x30"  Hook lying clamshells c YTB 2x10  Supine marches c YTB x20 ea  Supine bridges c YTB " "2x10  +Supine hip add (ball squeeze) x20 3" hold  +SLR 2x10  LAQs 2x10  +Gastroc stretch on incline board 3x30"  +Heel raises x30  +Mini squats 2x10 (verbal cueing for proper form)    Deb received the following manual therapy techniques:  were applied to the:  for  minutes, including:      Deb participated in neuromuscular re-education activities to improve: Balance, Coordination, Proprioception and Posture for  minutes. The following activities were included:        Deb received cold pack for 5 minutes to bilateral knees.      Home Exercises Provided and Patient Education Provided     Education provided:   - HEP review    Written Home Exercises Provided: Patient instructed to cont prior HEP.  Exercises were reviewed and Deb was able to demonstrate them prior to the end of the session.  Deb demonstrated good  understanding of the education provided.     See EMR under Patient Instructions for exercises provided prior visit.    Assessment     Deb returned for her first follow up visit reporting bilateral knee pain of 7/10 upon arrival for treatment.  She was not complaint with HEP since initial evaluation due to a death in the family. Treatment began with HEP review with Deb demonstrating a good understanding of exercises requiring on ly min verbal cueing to ensure proper form. Treatment progressed to further challenge hip/quad/gastroc strengthening and LE flexibility. She tolerated treatment well reporting decreased pain levels post treatment.    Deb Is progressing well towards her goals.   Pt prognosis is Fair.     Pt will continue to benefit from skilled outpatient physical therapy to address the deficits listed in the problem list box on initial evaluation, provide pt/family education and to maximize pt's level of independence in the home and community environment.     Pt's spiritual, cultural and educational needs considered and pt agreeable to plan of care and goals.     Anticipated " barriers to physical therapy: PMHx    Goals:   Short Term Goals: 5 weeks   Patient to report worst pain 4/10  Patient to improve BLE strength by 1/2 grade in deficieint areas  Patient to improve 5x sit to stand by 5 seconds  Patient to tolerate flexibility assessment  Patient to tolerate balance assessment     Long Term Goals: 10 weeks   Patient to report worst pain 1/10  Patient to improve BLE strength by 1 grade in deficieint areas  Patient to improve 5x sit to stand by 10 seconds  Additional goals to come as patient progresses    Plan     Plan of care Certification: 7/17/2023 to 9/25/2023.     Outpatient Physical Therapy 1 times weekly for 10 weeks to include the following interventions: Electrical Stimulation per contraindications cleared, Gait Training, Manual Therapy, Moist Heat/ Ice, Neuromuscular Re-ed, Patient Education, Self Care, Therapeutic Activities, and Therapeutic Exercise.        Eleazar Zurita, PTA      declines

## 2023-08-29 ENCOUNTER — INPATIENT (INPATIENT)
Facility: HOSPITAL | Age: 58
LOS: 1 days | Discharge: HOME | End: 2023-08-31
Attending: GENERAL ACUTE CARE HOSPITAL | Admitting: GENERAL ACUTE CARE HOSPITAL
Payer: MEDICAID

## 2023-08-29 VITALS
SYSTOLIC BLOOD PRESSURE: 146 MMHG | DIASTOLIC BLOOD PRESSURE: 88 MMHG | OXYGEN SATURATION: 95 % | TEMPERATURE: 98 F | HEART RATE: 90 BPM | HEIGHT: 65 IN | WEIGHT: 164.91 LBS | RESPIRATION RATE: 16 BRPM

## 2023-08-29 DIAGNOSIS — Z86.73 PERSONAL HISTORY OF TRANSIENT ISCHEMIC ATTACK (TIA), AND CEREBRAL INFARCTION WITHOUT RESIDUAL DEFICITS: ICD-10-CM

## 2023-08-29 DIAGNOSIS — R56.9 UNSPECIFIED CONVULSIONS: ICD-10-CM

## 2023-08-29 DIAGNOSIS — E87.6 HYPOKALEMIA: ICD-10-CM

## 2023-08-29 DIAGNOSIS — E78.5 HYPERLIPIDEMIA, UNSPECIFIED: ICD-10-CM

## 2023-08-29 DIAGNOSIS — I10 ESSENTIAL (PRIMARY) HYPERTENSION: ICD-10-CM

## 2023-08-29 DIAGNOSIS — Z87.898 PERSONAL HISTORY OF OTHER SPECIFIED CONDITIONS: ICD-10-CM

## 2023-08-29 LAB
ALBUMIN SERPL ELPH-MCNC: 4.2 G/DL — SIGNIFICANT CHANGE UP (ref 3.3–5)
ALP SERPL-CCNC: 92 U/L — SIGNIFICANT CHANGE UP (ref 40–120)
ALT FLD-CCNC: 42 U/L — SIGNIFICANT CHANGE UP (ref 12–78)
ANION GAP SERPL CALC-SCNC: 6 MMOL/L — SIGNIFICANT CHANGE UP (ref 5–17)
APPEARANCE UR: CLEAR — SIGNIFICANT CHANGE UP
APTT BLD: 24.6 SEC — SIGNIFICANT CHANGE UP (ref 24.5–35.6)
AST SERPL-CCNC: 39 U/L — HIGH (ref 15–37)
BASOPHILS # BLD AUTO: 0.04 K/UL — SIGNIFICANT CHANGE UP (ref 0–0.2)
BASOPHILS NFR BLD AUTO: 0.6 % — SIGNIFICANT CHANGE UP (ref 0–2)
BILIRUB SERPL-MCNC: 0.9 MG/DL — SIGNIFICANT CHANGE UP (ref 0.2–1.2)
BILIRUB UR-MCNC: NEGATIVE — SIGNIFICANT CHANGE UP
BUN SERPL-MCNC: 17 MG/DL — SIGNIFICANT CHANGE UP (ref 7–23)
CALCIUM SERPL-MCNC: 8.7 MG/DL — SIGNIFICANT CHANGE UP (ref 8.5–10.1)
CHLORIDE SERPL-SCNC: 108 MMOL/L — SIGNIFICANT CHANGE UP (ref 96–108)
CO2 SERPL-SCNC: 25 MMOL/L — SIGNIFICANT CHANGE UP (ref 22–31)
COLOR SPEC: YELLOW — SIGNIFICANT CHANGE UP
CREAT SERPL-MCNC: 0.85 MG/DL — SIGNIFICANT CHANGE UP (ref 0.5–1.3)
DIFF PNL FLD: NEGATIVE — SIGNIFICANT CHANGE UP
EGFR: 101 ML/MIN/1.73M2 — SIGNIFICANT CHANGE UP
EOSINOPHIL # BLD AUTO: 0.14 K/UL — SIGNIFICANT CHANGE UP (ref 0–0.5)
EOSINOPHIL NFR BLD AUTO: 2.3 % — SIGNIFICANT CHANGE UP (ref 0–6)
GLUCOSE SERPL-MCNC: 131 MG/DL — HIGH (ref 70–99)
GLUCOSE UR QL: NEGATIVE MG/DL — SIGNIFICANT CHANGE UP
HCT VFR BLD CALC: 43.7 % — SIGNIFICANT CHANGE UP (ref 39–50)
HGB BLD-MCNC: 15 G/DL — SIGNIFICANT CHANGE UP (ref 13–17)
IMM GRANULOCYTES NFR BLD AUTO: 0.3 % — SIGNIFICANT CHANGE UP (ref 0–0.9)
INR BLD: 0.94 RATIO — SIGNIFICANT CHANGE UP (ref 0.85–1.18)
KETONES UR-MCNC: NEGATIVE — SIGNIFICANT CHANGE UP
LACTATE SERPL-SCNC: 1.4 MMOL/L — SIGNIFICANT CHANGE UP (ref 0.7–2)
LEUKOCYTE ESTERASE UR-ACNC: NEGATIVE — SIGNIFICANT CHANGE UP
LYMPHOCYTES # BLD AUTO: 1.92 K/UL — SIGNIFICANT CHANGE UP (ref 1–3.3)
LYMPHOCYTES # BLD AUTO: 31 % — SIGNIFICANT CHANGE UP (ref 13–44)
MCHC RBC-ENTMCNC: 31.9 PG — SIGNIFICANT CHANGE UP (ref 27–34)
MCHC RBC-ENTMCNC: 34.3 G/DL — SIGNIFICANT CHANGE UP (ref 32–36)
MCV RBC AUTO: 93 FL — SIGNIFICANT CHANGE UP (ref 80–100)
MONOCYTES # BLD AUTO: 0.56 K/UL — SIGNIFICANT CHANGE UP (ref 0–0.9)
MONOCYTES NFR BLD AUTO: 9 % — SIGNIFICANT CHANGE UP (ref 2–14)
NEUTROPHILS # BLD AUTO: 3.52 K/UL — SIGNIFICANT CHANGE UP (ref 1.8–7.4)
NEUTROPHILS NFR BLD AUTO: 56.8 % — SIGNIFICANT CHANGE UP (ref 43–77)
NITRITE UR-MCNC: NEGATIVE — SIGNIFICANT CHANGE UP
NRBC # BLD: 0 /100 WBCS — SIGNIFICANT CHANGE UP (ref 0–0)
PCP SPEC-MCNC: SIGNIFICANT CHANGE UP
PH UR: 6 — SIGNIFICANT CHANGE UP (ref 5–8)
PLATELET # BLD AUTO: 204 K/UL — SIGNIFICANT CHANGE UP (ref 150–400)
POTASSIUM SERPL-MCNC: 3.3 MMOL/L — LOW (ref 3.5–5.3)
POTASSIUM SERPL-SCNC: 3.3 MMOL/L — LOW (ref 3.5–5.3)
PROT SERPL-MCNC: 7.6 GM/DL — SIGNIFICANT CHANGE UP (ref 6–8.3)
PROT UR-MCNC: 30 MG/DL
PROTHROM AB SERPL-ACNC: 11.3 SEC — SIGNIFICANT CHANGE UP (ref 9.5–13)
RBC # BLD: 4.7 M/UL — SIGNIFICANT CHANGE UP (ref 4.2–5.8)
RBC # FLD: 12.1 % — SIGNIFICANT CHANGE UP (ref 10.3–14.5)
RBC CASTS # UR COMP ASSIST: NEGATIVE /HPF — SIGNIFICANT CHANGE UP (ref 0–4)
SODIUM SERPL-SCNC: 139 MMOL/L — SIGNIFICANT CHANGE UP (ref 135–145)
SP GR SPEC: 1.01 — SIGNIFICANT CHANGE UP (ref 1.01–1.02)
TROPONIN I, HIGH SENSITIVITY RESULT: 4.5 NG/L — SIGNIFICANT CHANGE UP
UROBILINOGEN FLD QL: NEGATIVE MG/DL — SIGNIFICANT CHANGE UP
WBC # BLD: 6.2 K/UL — SIGNIFICANT CHANGE UP (ref 3.8–10.5)
WBC # FLD AUTO: 6.2 K/UL — SIGNIFICANT CHANGE UP (ref 3.8–10.5)
WBC UR QL: SIGNIFICANT CHANGE UP

## 2023-08-29 PROCEDURE — 70496 CT ANGIOGRAPHY HEAD: CPT | Mod: 26,MA

## 2023-08-29 PROCEDURE — 73070 X-RAY EXAM OF ELBOW: CPT | Mod: 26,LT

## 2023-08-29 PROCEDURE — 70450 CT HEAD/BRAIN W/O DYE: CPT | Mod: 26,59,MA

## 2023-08-29 PROCEDURE — 70498 CT ANGIOGRAPHY NECK: CPT | Mod: 26,MA

## 2023-08-29 PROCEDURE — 0042T: CPT

## 2023-08-29 PROCEDURE — 93010 ELECTROCARDIOGRAM REPORT: CPT

## 2023-08-29 PROCEDURE — 99285 EMERGENCY DEPT VISIT HI MDM: CPT

## 2023-08-29 PROCEDURE — 99223 1ST HOSP IP/OBS HIGH 75: CPT

## 2023-08-29 RX ORDER — ACETAMINOPHEN 500 MG
650 TABLET ORAL EVERY 6 HOURS
Refills: 0 | Status: DISCONTINUED | OUTPATIENT
Start: 2023-08-29 | End: 2023-08-31

## 2023-08-29 RX ORDER — ASPIRIN/CALCIUM CARB/MAGNESIUM 324 MG
324 TABLET ORAL ONCE
Refills: 0 | Status: COMPLETED | OUTPATIENT
Start: 2023-08-29 | End: 2023-08-29

## 2023-08-29 RX ORDER — THIAMINE MONONITRATE (VIT B1) 100 MG
100 TABLET ORAL DAILY
Refills: 0 | Status: DISCONTINUED | OUTPATIENT
Start: 2023-08-29 | End: 2023-08-31

## 2023-08-29 RX ORDER — LEVETIRACETAM 250 MG/1
500 TABLET, FILM COATED ORAL
Refills: 0 | Status: DISCONTINUED | OUTPATIENT
Start: 2023-08-29 | End: 2023-08-31

## 2023-08-29 RX ORDER — POTASSIUM CHLORIDE 20 MEQ
20 PACKET (EA) ORAL
Refills: 0 | Status: COMPLETED | OUTPATIENT
Start: 2023-08-29 | End: 2023-08-29

## 2023-08-29 RX ORDER — LEVETIRACETAM 250 MG/1
1000 TABLET, FILM COATED ORAL ONCE
Refills: 0 | Status: COMPLETED | OUTPATIENT
Start: 2023-08-29 | End: 2023-08-29

## 2023-08-29 RX ORDER — LANOLIN ALCOHOL/MO/W.PET/CERES
3 CREAM (GRAM) TOPICAL AT BEDTIME
Refills: 0 | Status: DISCONTINUED | OUTPATIENT
Start: 2023-08-29 | End: 2023-08-31

## 2023-08-29 RX ORDER — ASPIRIN/CALCIUM CARB/MAGNESIUM 324 MG
81 TABLET ORAL DAILY
Refills: 0 | Status: DISCONTINUED | OUTPATIENT
Start: 2023-08-30 | End: 2023-08-31

## 2023-08-29 RX ORDER — ATORVASTATIN CALCIUM 80 MG/1
40 TABLET, FILM COATED ORAL AT BEDTIME
Refills: 0 | Status: DISCONTINUED | OUTPATIENT
Start: 2023-08-29 | End: 2023-08-31

## 2023-08-29 RX ADMIN — LEVETIRACETAM 1000 MILLIGRAM(S): 250 TABLET, FILM COATED ORAL at 10:42

## 2023-08-29 RX ADMIN — LEVETIRACETAM 500 MILLIGRAM(S): 250 TABLET, FILM COATED ORAL at 17:02

## 2023-08-29 RX ADMIN — Medication 20 MILLIEQUIVALENT(S): at 13:39

## 2023-08-29 RX ADMIN — Medication 100 MILLIGRAM(S): at 11:58

## 2023-08-29 RX ADMIN — Medication 20 MILLIEQUIVALENT(S): at 11:58

## 2023-08-29 RX ADMIN — Medication 324 MILLIGRAM(S): at 12:02

## 2023-08-29 RX ADMIN — ATORVASTATIN CALCIUM 40 MILLIGRAM(S): 80 TABLET, FILM COATED ORAL at 21:49
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
## 2023-08-29 NOTE — ED PROVIDER NOTE - PHYSICAL EXAMINATION
Neuro:   (+ )right facial droop  (+) slight slurred speech  (+) right arm dysmetria    5/5 strength LUE, RUE  5/5 strength LLE, RLE

## 2023-08-29 NOTE — PATIENT PROFILE ADULT - FALL HARM RISK - HARM RISK INTERVENTIONS

## 2023-08-29 NOTE — ED ADULT TRIAGE NOTE - CHIEF COMPLAINT QUOTE
BIBA for witnessed seizure at work. Denies any complaints at this time. Appears confused, post-ictal state. PMH seizures
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
## 2023-08-29 NOTE — H&P ADULT - ASSESSMENT
58 years old male with h/o HTN, HLD, h/o left MCA CVA, seizure was brought in to ED for seizure concern. Patient is relatively a poor history despite using  ID [id number]. Per triage note, patient was brought in for witness seizure at work. Per patient, he reported felt lightheaded at around 7:30AM and sat down. He feel like he had a seizure. Patient reported that he has not been drinking for 1 year or so but yesterday, patient drank 150ml of vodka. Denied any urinary/bladder incontinence. Patient has not been taking keppra for > 1 year.   Patient was confused upon ED arrival, had double vision. ED talked with telestroke, no TPA, likely seizure but need to R/O CVA. Hemodynamically stable, afebrile, sat well at RA. No leukocytosis, plt 204, K 3.3, Cr 0.85, lactate 1.4, hsTnt 4.5. CT head with old large left MCA infarction. No intracranial hemorrhage/lesion is noted. CTA with intracranial atherosclerosis. No large vessel occlusion. CT perfusion with no acute infarction of the brain is convincingly demonstrated.

## 2023-08-29 NOTE — PATIENT PROFILE ADULT - NSFALLSECTIONLABEL_GEN_A_CORE

## 2023-08-29 NOTE — H&P ADULT - NSHPADDITIONALINFOADULT_GEN_ALL_CORE
Prior document with h/o DM. Serum glucose normal. Previous A1c less than 6. Not on DM med. Patient denied DM. Check A1c

## 2023-08-29 NOTE — H&P ADULT - PROBLEM SELECTOR PLAN 6
patient reported stopped drinking > 1 year, started drinking yesterday ( different history from triage note)  thiamine 100mg daily  Monitor for withdrawal--> if concern for withdrawal, will then start CIWA protocol

## 2023-08-29 NOTE — ED ADULT NURSE NOTE - NSFALLUNIVINTERV_ED_ALL_ED
Bed/Stretcher in lowest position, wheels locked, appropriate side rails in place/Call bell, personal items and telephone in reach/Instruct patient to call for assistance before getting out of bed/chair/stretcher/Non-slip footwear applied when patient is off stretcher/[place] to call system/Physically safe environment - no spills, clutter or unnecessary equipment/Purposeful proactive rounding/Room/bathroom lighting operational, light cord in reach

## 2023-08-29 NOTE — ED ADULT NURSE NOTE - CAS TRG GENERAL AIRWAY, MLM
JOURDAN following for DC planning.    Informed by [facility] liaison that [facility] process for ECF approval has changed.  JOURDAN needs to notify SW at [facility] on skilled facility need and they will intern contact [facility] with approval.    Message left for JOURDAN Geller .[phone].    Will follow pending [facility] decision for ECF transfer.    DANA Fuentes, PAVAN   [id number]      Addendum 1613:    Received call from BANDAR Yu who explained pt's case will need to be approved by their physiatrist before he can be transferred to an ECF. Aimee confirmed pt is 100% service connected but still requires skilled facility approval from their physician.      Faxed referral packet to [phone]. Spoke with JOURDAN [facility] who will let writer know when packet is received.    JOURDAN will follow.    DANA Fuentes, PAVAN   [id number]      Addendum 1642:    Received call from Aimee asking JOURDAN to fax to a different number:  Fax sen to [phone].  DANA Fuentes, PAVAN   [id number]     Patent

## 2023-08-29 NOTE — ED PROVIDER NOTE - OBJECTIVE STATEMENT
BIBA for witnessed seizure at work. PMH DM2, HTN, HLD, previous L MCA territory. Patient on Keppra for seizures. BIBA for witnessed seizure at work. PMH DM2, HTN, HLD, previous L MCA territory. Patient on Keppra for seizures. Patient apparently had seizure at 7:30am. Patient on arrival endorsed double vision. However he was confused and AOx1. Patient was not able to initially provide any ROS or participate in the exam.

## 2023-08-30 LAB
A1C WITH ESTIMATED AVERAGE GLUCOSE RESULT: 5.6 % — SIGNIFICANT CHANGE UP (ref 4–5.6)
ALBUMIN SERPL ELPH-MCNC: 3.6 G/DL — SIGNIFICANT CHANGE UP (ref 3.3–5)
ALP SERPL-CCNC: 79 U/L — SIGNIFICANT CHANGE UP (ref 40–120)
ALT FLD-CCNC: 35 U/L — SIGNIFICANT CHANGE UP (ref 12–78)
AMPHET UR-MCNC: NEGATIVE — SIGNIFICANT CHANGE UP
ANION GAP SERPL CALC-SCNC: 4 MMOL/L — LOW (ref 5–17)
AST SERPL-CCNC: 27 U/L — SIGNIFICANT CHANGE UP (ref 15–37)
BARBITURATES UR SCN-MCNC: NEGATIVE — SIGNIFICANT CHANGE UP
BENZODIAZ UR-MCNC: NEGATIVE — SIGNIFICANT CHANGE UP
BILIRUB SERPL-MCNC: 1 MG/DL — SIGNIFICANT CHANGE UP (ref 0.2–1.2)
BUN SERPL-MCNC: 13 MG/DL — SIGNIFICANT CHANGE UP (ref 7–23)
CALCIUM SERPL-MCNC: 8.8 MG/DL — SIGNIFICANT CHANGE UP (ref 8.5–10.1)
CHLORIDE SERPL-SCNC: 110 MMOL/L — HIGH (ref 96–108)
CHOLEST SERPL-MCNC: 108 MG/DL — SIGNIFICANT CHANGE UP
CO2 SERPL-SCNC: 25 MMOL/L — SIGNIFICANT CHANGE UP (ref 22–31)
COCAINE METAB.OTHER UR-MCNC: NEGATIVE — SIGNIFICANT CHANGE UP
CREAT SERPL-MCNC: 0.73 MG/DL — SIGNIFICANT CHANGE UP (ref 0.5–1.3)
EGFR: 106 ML/MIN/1.73M2 — SIGNIFICANT CHANGE UP
ESTIMATED AVERAGE GLUCOSE: 114 MG/DL — SIGNIFICANT CHANGE UP (ref 68–114)
GLUCOSE SERPL-MCNC: 123 MG/DL — HIGH (ref 70–99)
HCT VFR BLD CALC: 44.7 % — SIGNIFICANT CHANGE UP (ref 39–50)
HDLC SERPL-MCNC: 51 MG/DL — SIGNIFICANT CHANGE UP
HGB BLD-MCNC: 15.3 G/DL — SIGNIFICANT CHANGE UP (ref 13–17)
LIPID PNL WITH DIRECT LDL SERPL: 42 MG/DL — SIGNIFICANT CHANGE UP
MAGNESIUM SERPL-MCNC: 2.3 MG/DL — SIGNIFICANT CHANGE UP (ref 1.6–2.6)
MCHC RBC-ENTMCNC: 31.6 PG — SIGNIFICANT CHANGE UP (ref 27–34)
MCHC RBC-ENTMCNC: 34.2 G/DL — SIGNIFICANT CHANGE UP (ref 32–36)
MCV RBC AUTO: 92.4 FL — SIGNIFICANT CHANGE UP (ref 80–100)
METHADONE UR-MCNC: NEGATIVE — SIGNIFICANT CHANGE UP
NON HDL CHOLESTEROL: 57 MG/DL — SIGNIFICANT CHANGE UP
NRBC # BLD: 0 /100 WBCS — SIGNIFICANT CHANGE UP (ref 0–0)
OPIATES UR-MCNC: NEGATIVE — SIGNIFICANT CHANGE UP
PCP UR-MCNC: NEGATIVE — SIGNIFICANT CHANGE UP
PHOSPHATE SERPL-MCNC: 2.2 MG/DL — LOW (ref 2.5–4.5)
PLATELET # BLD AUTO: 211 K/UL — SIGNIFICANT CHANGE UP (ref 150–400)
POTASSIUM SERPL-MCNC: 3.9 MMOL/L — SIGNIFICANT CHANGE UP (ref 3.5–5.3)
POTASSIUM SERPL-SCNC: 3.9 MMOL/L — SIGNIFICANT CHANGE UP (ref 3.5–5.3)
PROT SERPL-MCNC: 6.7 GM/DL — SIGNIFICANT CHANGE UP (ref 6–8.3)
RBC # BLD: 4.84 M/UL — SIGNIFICANT CHANGE UP (ref 4.2–5.8)
RBC # FLD: 12.3 % — SIGNIFICANT CHANGE UP (ref 10.3–14.5)
SODIUM SERPL-SCNC: 139 MMOL/L — SIGNIFICANT CHANGE UP (ref 135–145)
THC UR QL: NEGATIVE — SIGNIFICANT CHANGE UP
TRIGL SERPL-MCNC: 69 MG/DL — SIGNIFICANT CHANGE UP
WBC # BLD: 5.43 K/UL — SIGNIFICANT CHANGE UP (ref 3.8–10.5)
WBC # FLD AUTO: 5.43 K/UL — SIGNIFICANT CHANGE UP (ref 3.8–10.5)

## 2023-08-30 PROCEDURE — 99232 SBSQ HOSP IP/OBS MODERATE 35: CPT

## 2023-08-30 RX ADMIN — LEVETIRACETAM 500 MILLIGRAM(S): 250 TABLET, FILM COATED ORAL at 17:19

## 2023-08-30 RX ADMIN — LEVETIRACETAM 500 MILLIGRAM(S): 250 TABLET, FILM COATED ORAL at 05:38

## 2023-08-30 RX ADMIN — Medication 62.5 MILLIMOLE(S): at 14:11

## 2023-08-30 RX ADMIN — ATORVASTATIN CALCIUM 40 MILLIGRAM(S): 80 TABLET, FILM COATED ORAL at 22:51

## 2023-08-30 RX ADMIN — Medication 81 MILLIGRAM(S): at 11:47

## 2023-08-30 RX ADMIN — Medication 100 MILLIGRAM(S): at 11:47

## 2023-08-30 NOTE — CONSULT NOTE ADULT - SUBJECTIVE AND OBJECTIVE BOX
Neurology consult    JEREMIE [id number]     Patient is a 58y old  Male who presents with a chief complaint of seizure, R/O CVA (29 Aug 2023 11:51)      HPI:  58 years old male with h/o HTN, HLD, h/o left MCA CVA, seizure was brought in to ED for seizure concern. Patient is relatively a poor history despite using  ID [id number]. Per triage note, patient was brought in for witness seizure at work. Per patient, he reported felt lightheaded at around 7:30AM and sat down. He feel like he had a seizure. Patient reported that he has not been drinking for 1 year or so but yesterday, patient drank 150ml of vodka. Denied any urinary/bladder incontinence. Patient has not been taking keppra for > 1 year.   Patient was confused upon ED arrival, had double vision. ED talked with telestroke, no TPA, likely seizure but need to R/O CVA. Hemodynamically stable, afebrile, sat well at RA. No leukocytosis, plt 204, K 3.3, Cr 0.85, lactate 1.4, hsTnt 4.5. CT head with old large left MCA infarction. No intracranial hemorrhage/lesion is noted. CTA with intracranial atherosclerosis. No large vessel occlusion. CT perfusion with no acute infarction of the brain is convincingly demonstrated.        MEDICATIONS    acetaminophen     Tablet .. 650 milliGRAM(s) Oral every 6 hours PRN  aspirin enteric coated 81 milliGRAM(s) Oral daily  atorvastatin 40 milliGRAM(s) Oral at bedtime  levETIRAcetam 500 milliGRAM(s) Oral two times a day  melatonin 3 milliGRAM(s) Oral at bedtime PRN  thiamine 100 milliGRAM(s) Oral daily      PMH: Seizure    Diabetes    History of ischemic left MCA stroke         PSH: No significant past surgical history        Family history: No history of dementia, strokes, or seizures   FAMILY HISTORY:      SOCIAL HISTORY:  No history of tobacco or alcohol use     Allergies    Allergy Status Unknown    Intolerances            Vital Signs Last 24 Hrs  T(C): 36.7 (30 Aug 2023 05:00), Max: 37.2 (29 Aug 2023 10:44)  T(F): 98.1 (30 Aug 2023 05:00), Max: 98.9 (29 Aug 2023 10:44)  HR: 64 (30 Aug 2023 05:00) (62 - 86)  BP: 113/71 (30 Aug 2023 05:00) (111/68 - 139/84)  BP(mean): --  RR: 18 (30 Aug 2023 05:00) (16 - 18)  SpO2: 97% (30 Aug 2023 05:00) (94% - 98%)    Parameters below as of 30 Aug 2023 05:00  Patient On (Oxygen Delivery Method): room air          On Neurological Examination:    Mental Status - Patient is alert, awake, oriented X3. says month wrong age, follows simple commands mild aphasia closes eyes and fist to commands    Cranial Nerves - PERRL, EOMI, visual fields full V1-V3 intact, right facial, tongue/uvula midline    Motor Exam -   right sided mild weakness no driftt  Left upper no drift  lowers no drift      nml bulk/tone    Sensory    Intact to light touch and pinprick bilaterally    Coord: FTN intact bilaterally     NIHSS 4       LABS:  CBC Full  -  ( 30 Aug 2023 07:05 )  WBC Count : 5.43 K/uL  RBC Count : 4.84 M/uL  Hemoglobin : 15.3 g/dL  Hematocrit : 44.7 %  Platelet Count - Automated : 211 K/uL  Mean Cell Volume : 92.4 fl  Mean Cell Hemoglobin : 31.6 pg  Mean Cell Hemoglobin Concentration : 34.2 g/dL  Auto Neutrophil # : x  Auto Lymphocyte # : x  Auto Monocyte # : x  Auto Eosinophil # : x  Auto Basophil # : x  Auto Neutrophil % : x  Auto Lymphocyte % : x  Auto Monocyte % : x  Auto Eosinophil % : x  Auto Basophil % : x    Urinalysis Basic - ( 30 Aug 2023 07:05 )    Color: x / Appearance: x / SG: x / pH: x  Gluc: 123 mg/dL / Ketone: x  / Bili: x / Urobili: x   Blood: x / Protein: x / Nitrite: x   Leuk Esterase: x / RBC: x / WBC x   Sq Epi: x / Non Sq Epi: x / Bacteria: x      08-30    139  |  110<H>  |  13  ----------------------------<  123<H>  3.9   |  25  |  0.73    Ca    8.8      30 Aug 2023 07:05  Phos  2.2     08-30  Mg     2.3     08-30    TPro  6.7  /  Alb  3.6  /  TBili  1.0  /  DBili  x   /  AST  27  /  ALT  35  /  AlkPhos  79  08-30    LIVER FUNCTIONS - ( 30 Aug 2023 07:05 )  Alb: 3.6 g/dL / Pro: 6.7 gm/dL / ALK PHOS: 79 U/L / ALT: 35 U/L / AST: 27 U/L / GGT: x           Hemoglobin A1C:       PT/INR - ( 29 Aug 2023 08:00 )   PT: 11.3 sec;   INR: 0.94 ratio         PTT - ( 29 Aug 2023 08:00 )  PTT:24.6 sec      RADIOLOGY  < from: CT Angio Brain Stroke Protocol  w/ IV Cont (08.29.23 @ 08:24) >  IMPRESSION:    1.   Right carotid system:    No hemodynamically significant stenosis.    2.   Left carotid system:     No hemodynamically significant stenosis.    3.   Vertebral circulation:    Patent.    4.  Anterior intracranial circulation:     Intracranial atherosclerosis   left MCA mild-to-moderate in extent although patent to the region of   remote infarction. More mild disease in the right peripheral MCA   distribution    5.  Posterior intracranial circulation:    Intracranial atherosclerosis   each proximal posterior cerebral artery, mild-to-moderate.    6.  No large vessel occlusion    7.  Brain perfusion:   No acute infarction of the brain is convincingly   demonstrated.    --- End of Report ---    < end of copied text >

## 2023-08-30 NOTE — EEG REPORT - NS EEG TEXT BOX
JEREMIE PIERCE [id number]     Study Date:  08-30-23  Duration: 25 mins.   --------------------------------------------------------------------------------------------------  History:  CC/ HPI Patient is a 58y old  Male who presents with a chief complaint of seizure, R/O CVA (30 Aug 2023 10:21)    MEDICATIONS  (STANDING):  aspirin enteric coated 81 milliGRAM(s) Oral daily  atorvastatin 40 milliGRAM(s) Oral at bedtime  levETIRAcetam 500 milliGRAM(s) Oral two times a day  thiamine 100 milliGRAM(s) Oral daily    --------------------------------------------------------------------------------------------------  Study Interpretation:    [[[Abbreviation Key:  PDR=alpha rhythm/posterior dominant rhythm. A-P=anterior posterior.  Amplitude: ‘very low’:<20; ‘low’:20-49; ‘medium’:; ‘high’:>150uV.  Persistence for periodic/rhythmic patterns (% of epoch) ‘rare’:<1%; ‘occasional’:1-10%; ‘frequent’:10-50%; ‘abundant’:50-90%; ‘continuous’:>90%.  Persistence for sporadic discharges: ‘rare’:<1/hr; ‘occasional’:1/min-1/hr; ‘frequent’:>1/min; ‘abundant’:>1/10 sec.  RPP=rhythmic and periodic patterns; GRDA=generalized rhythmic delta activity; FIRDA=frontal intermittent GRDA; LRDA=lateralized rhythmic delta activity; TIRDA=temporal intermittent rhythmic delta activity;  LPD=PLED=lateralized periodic discharges; GPD=generalized periodic discharges; BIPDs =bilateral independent periodic discharges; Mf=multifocal; SIRPDs=stimulus induced rhythmic, periodic, or ictal appearing discharges; BIRDs=brief potentially ictal rhythmic discharges >4 Hz, lasting .5-10s; PFA (paroxysmal bursts >13 Hz or =8 Hz <10s).  Modifiers: +F=with fast component; +S=with spike component; +R=with rhythmic component.  S-B=burst suppression pattern.  Max=maximal. N1-drowsy; N2-stage II sleep; N3-slow wave sleep. SSS/BETS=small sharp spikes/benign epileptiform transients of sleep. HV=hyperventilation; PS=photic stimulation]]]    Daily EEG Visual Analysis    FINDINGS:      Background:  Continuity: continuous  Symmetry: asymmetric  PDR: 8.5 to 9 Hz, on the right hemisphere, reactive to eye closure. Symmetric low amplitude frontal beta noted in wakefulness.  Voltage: normal  Anterior Posterior Gradient: present  Other background findings: none  Breach: absent    Background Slowing:  Generalized slowing: none.  Focal slowing: Polymorphic delta/theta activity in the left hemisphere, predominant posteriorly.    State Changes:   -Drowsiness noted with increased slowing, attenuation of fast activity  -N2 sleep transients were not recorded.    Sporadic Epileptiform Discharges:    None    Rhythmic and Periodic Patterns (RPPs):  None     Electrographic and Electroclinical seizures:  None    Other Clinical Events:  None    Activation Procedures:   -Hyperventilation was not performed.    -Photic stimulation was performed and did not elicit any abnormalities.      Artifacts:  Intermittent myogenic and movement artifacts were noted. Continuous EKG artifact.     ECG:  Single channel ECG showed regular rhythm.    EEG Classification / Summary:  Abnormal EEG in the awake / drowsy state(s).  - Left hemispheric slowing, predominant posteriorly.     Clinical Impression:  - Left hemispheric focal cerebral dysfunction with posterior predominance, could be structural or functional in abnormality.    - There were no epileptiform abnormalities recorded.      This is fellow preliminary read, pending attending review.  -------------------------------------------------------------------------------------------------------  [facility] EEG Reading Room Ph#: [phone]  Epilepsy Answering Service after 5PM and before 8:30AM: Ph#: [phone]    AKASH Regan  Epilepsy Fellow   JEREMIE PIERCE [id number]     Study Date: 08-30-23  Duration: 25 min  --------------------------------------------------------------------------------------------------  History:  CC/ HPI Patient is a 58y old  Male who presents with a chief complaint of seizure, R/O CVA (30 Aug 2023 10:21)    MEDICATIONS  (STANDING):  aspirin enteric coated 81 milliGRAM(s) Oral daily  atorvastatin 40 milliGRAM(s) Oral at bedtime  levETIRAcetam 500 milliGRAM(s) Oral two times a day  thiamine 100 milliGRAM(s) Oral daily    --------------------------------------------------------------------------------------------------  Study Interpretation:    [[[Abbreviation Key:  PDR=alpha rhythm/posterior dominant rhythm. A-P=anterior posterior.  Amplitude: ‘very low’:<20; ‘low’:20-49; ‘medium’:; ‘high’:>150uV.  Persistence for periodic/rhythmic patterns (% of epoch) ‘rare’:<1%; ‘occasional’:1-10%; ‘frequent’:10-50%; ‘abundant’:50-90%; ‘continuous’:>90%.  Persistence for sporadic discharges: ‘rare’:<1/hr; ‘occasional’:1/min-1/hr; ‘frequent’:>1/min; ‘abundant’:>1/10 sec.  RPP=rhythmic and periodic patterns; GRDA=generalized rhythmic delta activity; FIRDA=frontal intermittent GRDA; LRDA=lateralized rhythmic delta activity; TIRDA=temporal intermittent rhythmic delta activity;  LPD=PLED=lateralized periodic discharges; GPD=generalized periodic discharges; BIPDs =bilateral independent periodic discharges; Mf=multifocal; SIRPDs=stimulus induced rhythmic, periodic, or ictal appearing discharges; BIRDs=brief potentially ictal rhythmic discharges >4 Hz, lasting .5-10s; PFA (paroxysmal bursts >13 Hz or =8 Hz <10s).  Modifiers: +F=with fast component; +S=with spike component; +R=with rhythmic component.  S-B=burst suppression pattern.  Max=maximal. N1-drowsy; N2-stage II sleep; N3-slow wave sleep. SSS/BETS=small sharp spikes/benign epileptiform transients of sleep. HV=hyperventilation; PS=photic stimulation]]]    Daily EEG Visual Analysis    FINDINGS:      Background:  Continuity: continuous  Symmetry: asymmetric  PDR: 9 Hz, less well formed on the left, reactive to eye closure. Symmetric low-amplitude frontal beta in wakefulness.  Voltage: normal  Anterior Posterior Gradient: present  Other background findings: none  Breach: absent    Background Slowing:  Generalized slowing: none  Focal slowing: Continuous left hemispheric focal polymorphic delta and theta slowing    State Changes:   Drowsiness is characterized by fragmentation, attenuation, and slowing of the background activity.  Stage 2 sleep is not captured.    Sporadic Epileptiform Discharges:    None    Rhythmic and Periodic Patterns (RPPs):  None     Electrographic and Electroclinical seizures:  None    Other Clinical Events:  None    Activation Procedures:   -Hyperventilation was not performed.    -Photic stimulation was performed and did not elicit any abnormalities or change in the background.      Artifacts:  Intermittent myogenic and movement artifacts are present.    EKG:  Single-lead EKG shows normal sinus rhythm.    EEG Classification / Summary:  Abnormal routine EEG in the awake / drowsy state(s).  - Left hemispheric focal slowing  - No epileptiform abnormalities are captured    Clinical Impression:  - Left hemispheric focal cerebral dysfunction can be structural or functional in etiology.       -------------------------------------------------------------------------------------------------------  [facility] EEG Reading Room Ph#: [phone]  Epilepsy Answering Service after 5PM and before 8:30AM: Ph#: [phone]    AKASH Regan  Epilepsy Fellow    Susan Sánchez MD  Attending Physician, [facility] Epilepsy [place]

## 2023-08-30 NOTE — PROGRESS NOTE ADULT - ASSESSMENT
54 yo Greek speaking M w/ hx of DM2, HTN, HLD, previous Left MCA CVA without deficits, , seizure hx, presented w/ near-syncope and AMS & was admitted w/ CVA w/ dysarthria, . He was given tPA & admitted to the ICU for observation on 6/27 & transferred to the medical floor the following day.    seizure, low suspicion for new stroke  - symptoms maybe due to prior VCA or old TBI that the patient says he had in [place]  - status post tPA  - repeat CT head showed a large old left MCA infarct w/ no acute abnormality or hemorrhage  - CTA brain showed no large vessel occlusion or aneurysm involving major proximal intracranial arteries  - CTA neck showed no stenosis involving major neck arteries  - c/w ASA & Lipitor  - TTE showed EF 55 to 60%  - patient unable to have MRI due to retained metal Hip/ elbow surgical repair in [place]   - neurology following   EEG: Left hemispheric focal slowing >> known previous CVA   - No epileptiform abnormalities are captured    Seizures  - c/w Keppra    DM 2  - Hgb A1C is 5.7  - c/w ISS    Prophylaxis:  DVT: Lovenox SQ  general precautions   seizure precautions         58 years old male with h/o HTN, HLD, h/o left MCA CVA, seizure was brought in to ED for seizure concern. Patient is relatively a poor history despite using  ID [id number]. Per triage note, patient was brought in for witness seizure at work. Per patient, he reported felt lightheaded at around 7:30AM and sat down. He feel like he had a seizure. Patient reported that he has not been drinking for 1 year or so but yesterday, patient drank 150ml of vodka. Denied any urinary/bladder incontinence. Patient has not been taking keppra for > 1 year.   Patient was confused upon ED arrival, had double vision. ED talked with telestroke, no TPA, likely seizure but need to R/O CVA. Hemodynamically stable, afebrile, sat well at RA. No leukocytosis, plt 204, K 3.3, Cr 0.85, lactate 1.4, hsTnt 4.5. CT head with old large left MCA infarction. No intracranial hemorrhage/lesion is noted. CTA with intracranial atherosclerosis. No large vessel occlusion. CT perfusion with no acute infarction of the brain is convincingly demonstrated.       Problem/Plan - 1:  ·  Problem: Seizure.   ·  Plan: present with witness seizure  CT head  ( I personally review) with old large left MCA infarction. No intracranial hemorrhage/lesion is noted. CTA with intracranial atherosclerosis. No large vessel occlusion. CT perfusion with no acute infarction of the brain is convincingly demonstrated  Patient was confused upon ED arrival, had double vision. ED talked with telestroke, no TPA, likely seizure but need to R/O CVA, most likely seizure in the setting of alcohol use  aspirin 325mg and then 81mg daily ( patient has not been taking aspirin at home)  loaded with keppra 1g in ED, continue with keppra 500mg bid ( patient has not been taking keppra for > 1 years)  Per prior note in 2021 admission, unable to do MRI due to metals in left elbow ( h/o accident with surgery in [place])--> Xray left elbow  Neuro check, telemetry monitoring, lipid panel, A1c  Permissive HTN for first 24 hours after symptoms onset  If unable to do MRI---> consider repeat CT head after 48 hours   EEG: Left hemispheric focal slowing >> known previous CVA   - No epileptiform abnormalities are captured   seizure precaution  Neurology consulted.     Problem/Plan - 2:  ·  Problem: Hypokalemia.   ·  Plan: K 3.3  repleted      Problem/Plan - 3:  ·  Problem: History of CVA (cerebrovascular accident).   ·  Plan: resume aspirin, statin.     Problem/Plan - 4:  ·  Problem: Benign essential HTN.   ·  Plan: permissive hypertension for 24 hours after symptoms onset.     Problem/Plan - 5:  ·  Problem: Hyperlipidemia, unspecified.   ·  Plan: resume atorvastatin 40mg hs  Lipid panel.     Problem/Plan - 6:  ·  Problem: History of alcohol use.   ·  Plan: patient reported stopped drinking > 1 year, started drinking yesterday ( different history from triage note)  thiamine 100mg daily  not in w/d

## 2023-08-30 NOTE — CONSULT NOTE ADULT - ASSESSMENT
58 years old male with h/o HTN, HLD, h/o left MCA CVA, seizure was brought in to ED for seizure concern. CT head with chronic left MCA infarction. CTA with intracranial atherosclerosis. No large vessel occlusion. CT perfusion with no acute infarction of the brain is convincingly demonstrated.    Impression: seizure, low suspicion for new stroke    MRI CI due to metal in elbow. repeat CT head unlikely to   continue with Keppra 500 mg BID  On asa statin  A1c LDL  tte  Can follow up with Neurology, Dr. Bladimir Vidales at [phone] 58 years old male with h/o HTN, HLD, h/o left MCA CVA, seizure was brought in to ED for seizure concern. residual deficits from MCA stroke. CT head with chronic left MCA infarction. CTA with intracranial atherosclerosis. No large vessel occlusion. CT perfusion with no acute infarction of the brain is convincingly demonstrated.    Impression: seizure, low suspicion for new stroke    MRI CI due to metal in elbow. repeat CT head unlikely to   continue with Keppra 500 mg BID  On asa statin  A1c LDL  tte  Can follow up with Neurology, Dr. Bladimir Vidales at [phone]

## 2023-08-30 NOTE — PROGRESS NOTE ADULT - SUBJECTIVE AND OBJECTIVE BOX
56 yo M w/ hx of DM2, HTN, HLD, previous CVA, seizure hx (thought to be from old CVA), presented w/ near-syncope and AMS & was admitted w/ CVA w/ dysarthria, . He was given tPA & admitted to the ICU for observation on 6/27 & transferred to the medical floor the following day.    Patient seen and examined bedside.   no overnight events     REVIEW OF SYSTEMS: remaining ROS negative     MEDICATIONS  (STANDING):  aspirin enteric coated 81 milliGRAM(s) Oral daily  atorvastatin 80 milliGRAM(s) Oral at bedtime  chlorhexidine 2% Cloths 1 Application(s) Topical <User Schedule>  dextrose 40% Gel 15 Gram(s) Oral once  dextrose 5%. 1000 milliLiter(s) (50 mL/Hr) IV Continuous <Continuous>  dextrose 5%. 1000 milliLiter(s) (100 mL/Hr) IV Continuous <Continuous>  dextrose 50% Injectable 25 Gram(s) IV Push once  dextrose 50% Injectable 12.5 Gram(s) IV Push once  dextrose 50% Injectable 25 Gram(s) IV Push once  enoxaparin Injectable 40 milliGRAM(s) SubCutaneous daily  glucagon  Injectable 1 milliGRAM(s) IntraMuscular once  glucagon  Injectable 1 milliGRAM(s) IntraMuscular once  insulin lispro (ADMELOG) corrective regimen sliding scale   SubCutaneous three times a day before meals  insulin lispro (ADMELOG) corrective regimen sliding scale   SubCutaneous at bedtime  levETIRAcetam 500 milliGRAM(s) Oral two times a day    MEDICATIONS  (PRN):      Allergies    Allergy Status Unknown    Intolerances        Vital Signs Last 24 Hrs  T(C): 37.1 (29 Jun 2021 16:14), Max: 37.1 (29 Jun 2021 16:14)  T(F): 98.8 (29 Jun 2021 16:14), Max: 98.8 (29 Jun 2021 16:14)  HR: 76 (29 Jun 2021 16:14) (60 - 89)  BP: 133/88 (29 Jun 2021 16:14) (110/66 - 133/93)  BP(mean): 79 (28 Jun 2021 22:00) (79 - 79)  RR: 18 (29 Jun 2021 16:14) (18 - 19)  SpO2: 95% (29 Jun 2021 16:14) (94% - 97%)    PHYSICAL EXAM:  GENERAL: NAD, well-groomed, well-developed  HEAD:  Atraumatic, Normocephalic  EYES: EOMI, PERRLA   NECK: Supple   NERVOUS SYSTEM:  Alert & Oriented X3, dysarthric   CHEST/LUNG: Clear to auscultation bilaterally; No rales, rhonchi, wheezing, or rubs  HEART: Regular rate and rhythm; No murmurs, rubs, or gallops  ABDOMEN: Soft, Nontender, Nondistended; Bowel sounds present  EXTREMITIES:  No clubbing, cyanosis, or edema       LABS:                        14.4   5.35  )-----------( 225      ( 29 Jun 2021 07:17 )             42.0     06-29    141  |  108  |  13  ----------------------------<  113<H>  3.6   |  27  |  0.70    Ca    8.8      29 Jun 2021 07:17  Phos  2.8     06-29  Mg     2.3     06-29    TPro  6.9  /  Alb  3.8  /  TBili  0.8  /  DBili  x   /  AST  34  /  ALT  35  /  AlkPhos  79  06-29            Consultant(s) Notes Reveiwed [x ] Yes     Care Discussed with [x ] Consultants  [ x] Patient  [ ] Family  [ 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 Patient is relatively a poor history despite using  ID [id number]. Per triage note, patient was brought in for witness seizure at work. Per patient, he reported felt lightheaded at around 7:30AM and sat down. He feel like he had a seizure. Patient reported that he has not been drinking for 1 year or so but yesterday, patient drank 150ml of vodka. Denied any urinary/bladder incontinence. Patient has not been taking keppra for > 1 year.   Patient was confused upon ED arrival, had double vision. ED talked with telestroke, no TPA, likely seizure but need to R/O CVA. Hemodynamically stable, afebrile, sat well at RA. No leukocytosis, plt 204, K 3.3, Cr 0.85, lactate 1.4, hsTnt 4.5. CT head with old large left MCA infarction. No intracranial hemorrhage/lesion is noted. CTA with intracranial atherosclerosis. No large vessel occlusion. CT perfusion with no acute infarction of the brain is convincingly demonstrated.    SH: reported stopped drinking for 1 years, started drinking yesterday ( occasional drinking per triage- different history)    Patient seen and examined bedside.   no overnight events     REVIEW OF SYSTEMS: remaining ROS negative     MEDICATIONS  (STANDING):  aspirin enteric coated 81 milliGRAM(s) Oral daily  atorvastatin 80 milliGRAM(s) Oral at bedtime  chlorhexidine 2% Cloths 1 Application(s) Topical <User Schedule>  dextrose 40% Gel 15 Gram(s) Oral once  dextrose 5%. 1000 milliLiter(s) (50 mL/Hr) IV Continuous <Continuous>  dextrose 5%. 1000 milliLiter(s) (100 mL/Hr) IV Continuous <Continuous>  dextrose 50% Injectable 25 Gram(s) IV Push once  dextrose 50% Injectable 12.5 Gram(s) IV Push once  dextrose 50% Injectable 25 Gram(s) IV Push once  enoxaparin Injectable 40 milliGRAM(s) SubCutaneous daily  glucagon  Injectable 1 milliGRAM(s) IntraMuscular once  glucagon  Injectable 1 milliGRAM(s) IntraMuscular once  insulin lispro (ADMELOG) corrective regimen sliding scale   SubCutaneous three times a day before meals  insulin lispro (ADMELOG) corrective regimen sliding scale   SubCutaneous at bedtime  levETIRAcetam 500 milliGRAM(s) Oral two times a day    MEDICATIONS  (PRN):      Allergies    Allergy Status Unknown    Intolerances        vitals stable     PHYSICAL EXAM:  GENERAL: NAD, no increased WOB  HEAD:  Atraumatic, Normocephalic  EYES: EOMI, PERRLA, conjunctiva and sclera clear  ENMT: No tonsillar erythema, exudates, or enlargement; Moist mucous membranes   NECK: Supple, No JVD,    NERVOUS SYSTEM:  Alert & Oriented X3, Good concentration; nonfocal   CHEST/LUNG: CTAB;  No rales, rhonchi, wheezing, or rubs  HEART: Regular rate and rhythm; No murmurs, rubs, or gallops  ABDOMEN: Soft, Nontender, Nondistended; Bowel sounds present  EXTREMITIES:  2+ Peripheral Pulses b/l, No clubbing, cyanosis, or edema b/l            LABS:                                   15.3   5.43  )-----------( 211      ( 30 Aug 2023 07:05 )             44.7   08-30    139  |  110<H>  |  13  ----------------------------<  123<H>  3.9   |  25  |  0.73    Ca    8.8      30 Aug 2023 07:05  Phos  2.2     08-30  Mg     2.3     08-30    TPro  6.7  /  Alb  3.6  /  TBili  1.0  /  DBili  x   /  AST  27  /  ALT  35  /  AlkPhos  79  08-30          Consultant(s) Notes Reveiwed [x ] Yes     Care Discussed with [x ] Consultants  [ x] Patient  [ ] Family  [ 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

## 2023-08-31 VITALS
TEMPERATURE: 98 F | RESPIRATION RATE: 19 BRPM | SYSTOLIC BLOOD PRESSURE: 121 MMHG | OXYGEN SATURATION: 96 % | DIASTOLIC BLOOD PRESSURE: 75 MMHG | HEART RATE: 66 BPM

## 2023-08-31 LAB
LEVETIRACETAM SERPL-MCNC: <2 UG/ML — LOW (ref 10–40)
PHOSPHATE SERPL-MCNC: 2.5 MG/DL — SIGNIFICANT CHANGE UP (ref 2.5–4.5)

## 2023-08-31 PROCEDURE — 99239 HOSP IP/OBS DSCHRG MGMT >30: CPT

## 2023-08-31 RX ORDER — THIAMINE MONONITRATE (VIT B1) 100 MG
1 TABLET ORAL
Qty: 0 | Refills: 0 | DISCHARGE
Start: 2023-08-31

## 2023-08-31 RX ORDER — FOLIC ACID 0.8 MG
1 TABLET ORAL
Qty: 0 | Refills: 0 | DISCHARGE

## 2023-08-31 RX ORDER — LEVETIRACETAM 250 MG/1
1 TABLET, FILM COATED ORAL
Qty: 60 | Refills: 0
Start: 2023-08-31 | End: 2023-09-29

## 2023-08-31 RX ORDER — ATORVASTATIN CALCIUM 80 MG/1
1 TABLET, FILM COATED ORAL
Qty: 30 | Refills: 0
Start: 2023-08-31 | End: 2023-09-29

## 2023-08-31 RX ADMIN — Medication 81 MILLIGRAM(S): at 12:02

## 2023-08-31 RX ADMIN — Medication 100 MILLIGRAM(S): at 12:03

## 2023-08-31 RX ADMIN — LEVETIRACETAM 500 MILLIGRAM(S): 250 TABLET, FILM COATED ORAL at 05:21

## 2023-08-31 NOTE — DISCHARGE NOTE PROVIDER - CARE PROVIDER_API CALL
your primary care doctor,   Phone: (   )    -  Fax: (   )    -  Follow Up Time: 1 week    Bladimir Vidales)  Neurology; Neurophysiology  [address], [address]  [place] [address]  Phone: [phone]  Fax: [phone]  Follow Up Time: 1 week

## 2023-08-31 NOTE — DISCHARGE NOTE PROVIDER - PROVIDER TOKENS
FREE:[LAST:[your primary care doctor],PHONE:[(   )    -],FAX:[(   )    -],FOLLOWUP:[1 week]],PROVIDER:[TOKEN:[[id number]],FOLLOWUP:[1 week]]

## 2023-08-31 NOTE — DISCHARGE NOTE PROVIDER - HOSPITAL COURSE
58 years old male with h/o HTN, HLD, h/o left MCA CVA with residual  subtle right facial, seizure was brought in to ED for breakthrough seizure.  Per triage note, patient was brought in for witness seizure at work. Patient reported that he has not been drinking for 1 year or so but yesterday, patient drank 150ml of vodka. Patient has not been taking keppra for > 1 year.  Patient was confused upon ED arrival, had double vision. ED talked with telestroke, no TPA, likely seizure but need to R/O CVA. EKG: NSR.   CT head with old large left MCA infarction. No intracranial hemorrhage/lesion is noted. CTA with intracranial atherosclerosis. No large vessel occlusion. CT perfusion with no acute infarction of the brain is convincingly demonstrated. EEG: Left hemispheric focal slowing >> known previous CVA, No epileptiform abnormalities are captured. Patient was seen by Neurology, low suspicion for new stroke. Unable to perform MRI d/t metal in elbow, however xray of left elbow does not reveal any metallic objects. Despite this, patient is AOx3 without any focal deficits. No need for MRI head at this time. Recommended to continue keppra 500mg bid. Outpatient Neuro follow-up     Vital Signs Last 24 Hrs  T(C): 36.8 (31 Aug 2023 11:49), Max: 36.9 (30 Aug 2023 12:12)  T(F): 98.3 (31 Aug 2023 11:49), Max: 98.5 (30 Aug 2023 12:12)  HR: 66 (31 Aug 2023 11:49) (60 - 67)  BP: 121/75 (31 Aug 2023 11:49) (110/70 - 126/78)  BP(mean): --  RR: 19 (31 Aug 2023 11:49) (17 - 19)  SpO2: 96% (31 Aug 2023 11:49) (95% - 98%)    Parameters below as of 31 Aug 2023 11:49  Patient On (Oxygen Delivery Method): room air  GENERAL: NAD,  no increased WOB  NERVOUS SYSTEM:  Alert & Oriented X3, Good concentration; nonfocal   CHEST/LUNG: CTAB;  No rales, rhonchi, wheezing, or rubs  HEART: Regular rate and rhythm; No murmurs, rubs, or gallops  ABDOMEN: Soft, Nontender, Nondistended; Bowel sounds present  EXTREMITIES:  2+ Peripheral Pulses b/l, No clubbing, cyanosis, or edema b/l          Breakthrough  Seizure, witnessed   8/31 : [facility] [id number]  ( patient has not been taking keppra for > 1 years)  loaded with keppra 1g in ED, continue with keppra 500mg bid   Per prior note in 2021 admission, unable to do MRI due to metals in left elbow ( h/o accident with surgery in [place])--> Xray left elbow did NOT reveal metallic object   A1c 5.6%, LDL 42  utox neg   per neuro, continue Keppra 500mg bid   education to patient and wife on avoidance of driving, operating heavy machinery, showering alone or leaving the house alone , etc.      Hypokalemia. Hypophosphatemia   repleted       History of CVA (cerebrovascular accident).   continue with aspirin, statin.  patient is ambulatory and independent       Benign essential HTN.   continue with home meds       Hyperlipidemia, unspecified.   continue with statin      History of alcohol use.   --patient reported stopped drinking > 1 year, started drinking yesterday    thiamine 100mg daily, folic acid, MVI   not in w/d     Discharge time : 40 min   RETURN PARAMETERS DISCUSSED WITH PATIENT, PATIENT EXPRESSED UNDERSTANDING AND IS AGREEABLE. DISCUSSED WITH PATIENT ON REFRAINING FROM DRIVING UNTIL FOLLOW-UP/ CLEARED BY PMD. PATIENT EXPRESSED UNDERSTANDING.   Care plan and all findings were discussed in detail with patient and wife at bedside.  All questions and concerns addressed

## 2023-08-31 NOTE — DISCHARGE NOTE NURSING/CASE MANAGEMENT/SOCIAL WORK - NSDCPEFALRISK_GEN_ALL_CORE
For information on Fall & Injury Prevention, visit: [email or web address] OR  [email or web address] OR  https://www.cdc.gov/steadi/patient.html

## 2023-08-31 NOTE — DISCHARGE NOTE NURSING/CASE MANAGEMENT/SOCIAL WORK - PATIENT PORTAL LINK FT
You can access the FollowMyHealth Patient Portal offered by [facility] by registering at the following website: [email or web address]. By joining [employer]s FollowMyHealth portal, you will also be able to view your health information using other applications (apps) compatible with our system.

## 2023-08-31 NOTE — PROGRESS NOTE ADULT - SUBJECTIVE AND OBJECTIVE BOX
Patient seen and examined this am. No new events    MEDICATIONS:    acetaminophen     Tablet .. 650 milliGRAM(s) Oral every 6 hours PRN  aspirin enteric coated 81 milliGRAM(s) Oral daily  atorvastatin 40 milliGRAM(s) Oral at bedtime  levETIRAcetam 500 milliGRAM(s) Oral two times a day  melatonin 3 milliGRAM(s) Oral at bedtime PRN  thiamine 100 milliGRAM(s) Oral daily      LABS:                          15.3   5.43  )-----------( 211      ( 30 Aug 2023 07:05 )             44.7     08-30    139  |  110<H>  |  13  ----------------------------<  123<H>  3.9   |  25  |  0.73    Ca    8.8      30 Aug 2023 07:05  Phos  2.5     08-31  Mg     2.3     08-30    TPro  6.7  /  Alb  3.6  /  TBili  1.0  /  DBili  x   /  AST  27  /  ALT  35  /  AlkPhos  79  08-30    CAPILLARY BLOOD GLUCOSE          Urinalysis Basic - ( 30 Aug 2023 07:05 )    Color: x / Appearance: x / SG: x / pH: x  Gluc: 123 mg/dL / Ketone: x  / Bili: x / Urobili: x   Blood: x / Protein: x / Nitrite: x   Leuk Esterase: x / RBC: x / WBC x   Sq Epi: x / Non Sq Epi: x / Bacteria: x      I&O's Summary    30 Aug 2023 07:01  -  31 Aug 2023 07:00  --------------------------------------------------------  IN: 240 mL / OUT: 400 mL / NET: -160 mL      Vital Signs Last 24 Hrs  T(C): 36.8 (31 Aug 2023 11:49), Max: 36.8 (31 Aug 2023 11:49)  T(F): 98.3 (31 Aug 2023 11:49), Max: 98.3 (31 Aug 2023 11:49)  HR: 66 (31 Aug 2023 11:49) (60 - 67)  BP: 121/75 (31 Aug 2023 11:49) (110/70 - 126/78)  BP(mean): --  RR: 19 (31 Aug 2023 11:49) (18 - 19)  SpO2: 96% (31 Aug 2023 11:49) (95% - 96%)    Parameters below as of 31 Aug 2023 11:49  Patient On (Oxygen Delivery Method): room air        On Neurological Examination:    Mental Status - Patient is alert, awake, oriented X3. says month age, follows simple commands mild aphasia closes eyes and fist to commands    Cranial Nerves - PERRL, EOMI, visual fields full V1-V3 intact, right facial, tongue/uvula midline    Motor Exam -   right sided mild weakness no driftt  Left upper no drift  lowers no drift      nml bulk/tone    Sensory    Intact to light touch and pinprick bilaterally    Coord: FTN intact bilaterally     NIHSS 4     RADIOLOGY  < from: CT Angio Brain Stroke Protocol  w/ IV Cont (08.29.23 @ 08:24) >  IMPRESSION:    1.   Right carotid system:    No hemodynamically significant stenosis.    2.   Left carotid system:     No hemodynamically significant stenosis.    3.   Vertebral circulation:    Patent.    4.  Anterior intracranial circulation:     Intracranial atherosclerosis   left MCA mild-to-moderate in extent although patent to the region of   remote infarction. More mild disease in the right peripheral MCA   distribution    5.  Posterior intracranial circulation:    Intracranial atherosclerosis   each proximal posterior cerebral artery, mild-to-moderate.    6.  No large vessel occlusion    7.  Brain perfusion:   No acute infarction of the brain is convincingly   demonstrated.    --- End of Report ---    < end of copied text >

## 2023-08-31 NOTE — DISCHARGE NOTE PROVIDER - NSFOLLOWUPCLINICS_GEN_ALL_ED_FT
[facility]  Internal Medicine  [address] [place] [address]  Phone: [phone]  Fax:   Follow Up Time: 1 week

## 2023-08-31 NOTE — DISCHARGE NOTE PROVIDER - NSDCMRMEDTOKEN_GEN_ALL_CORE_FT
amLODIPine 10 mg oral tablet: 1 tab(s) orally once a day  aspirin 81 mg oral delayed release tablet: 1 tab(s) orally once a day  atorvastatin 40 mg oral tablet: 1 tab(s) orally once a day (at bedtime)  folic acid 1 mg oral tablet: 1 tab(s) orally once a day  levETIRAcetam 500 mg oral tablet: 1 tab(s) orally 2 times a day  Multiple Vitamins oral tablet: 1 tab(s) orally once a day  thiamine 100 mg oral tablet: 1 tab(s) orally once a day

## 2023-08-31 NOTE — DISCHARGE NOTE PROVIDER - NSDCCPCAREPLAN_GEN_ALL_CORE_FT
PRINCIPAL DISCHARGE DIAGNOSIS  Diagnosis: Seizure  Assessment and Plan of Treatment:       SECONDARY DISCHARGE DIAGNOSES  Diagnosis: Benign essential HTN  Assessment and Plan of Treatment:     Diagnosis: Hyperlipidemia, unspecified  Assessment and Plan of Treatment:     Diagnosis: History of alcohol use  Assessment and Plan of Treatment:     Diagnosis: History of CVA (cerebrovascular accident)  Assessment and Plan of Treatment:

## 2023-08-31 NOTE — DISCHARGE NOTE PROVIDER - NSDCFUADDAPPT_GEN_ALL_CORE_FT
It is important to see your primary physician as well as other necessary consultants within the next week to perform a comprehensive medical review.  Call their offices for an appointment as soon as you leave the hospital.  If you do not have a primary physician or cant reach him/her, contact the [facility] Physician Referral Service at [phone].  Your medical issues appear to be stable at this time, but if your symptoms recur or worsen, contact your physicians and/or return to the hospital if necessary.  If you encounter any issues or questions with your medication, call your physicians before stopping the medication.

## 2023-08-31 NOTE — PROGRESS NOTE ADULT - ASSESSMENT
58 years old male with h/o HTN, HLD, h/o left MCA CVA, seizure was brought in to ED for seizure concern. residual deficits from MCA stroke. CT head with chronic left MCA infarction. CTA with intracranial atherosclerosis. No large vessel occlusion. CT perfusion with no acute infarction of the brain is convincingly demonstrated.    Impression: seizure, low suspicion for new stroke    MRI CI due to metal in elbow. repeat CT head unlikely to   continue with Keppra 500 mg BID  On asa statin  A1c LDL  tte  Can follow up with Neurology, Dr. Bladimir Vidales at [phone] 58 years old male with h/o HTN, HLD, h/o left MCA CVA, seizure was brought in to ED for seizure concern. residual deficits from MCA stroke. CT head with chronic left MCA infarction. CTA with intracranial atherosclerosis. No large vessel occlusion. CT perfusion with no acute infarction of the brain is convincingly demonstrated.    Impression: seizure, low suspicion for new stroke    MRI CI due to metal in elbow. repeat CT head unlikely to   continue with Keppra 500 mg BID  On asa statin  A1c LDL  EEG reviewed  Can follow up with Neurology, Dr. Bladimir Vidales at [phone]

## 2023-09-07 DIAGNOSIS — I10 ESSENTIAL (PRIMARY) HYPERTENSION: ICD-10-CM

## 2023-09-07 DIAGNOSIS — F10.90 ALCOHOL USE, UNSPECIFIED, UNCOMPLICATED: ICD-10-CM

## 2023-09-07 DIAGNOSIS — E87.6 HYPOKALEMIA: ICD-10-CM

## 2023-09-07 DIAGNOSIS — E11.9 TYPE 2 DIABETES MELLITUS WITHOUT COMPLICATIONS: ICD-10-CM

## 2023-09-07 DIAGNOSIS — G40.909 EPILEPSY, UNSPECIFIED, NOT INTRACTABLE, WITHOUT STATUS EPILEPTICUS: ICD-10-CM

## 2023-09-07 DIAGNOSIS — E78.5 HYPERLIPIDEMIA, UNSPECIFIED: ICD-10-CM

## 2023-09-07 DIAGNOSIS — I69.398 OTHER SEQUELAE OF CEREBRAL INFARCTION: ICD-10-CM

## 2023-09-07 DIAGNOSIS — E83.39 OTHER DISORDERS OF PHOSPHORUS METABOLISM: ICD-10-CM

## 2024-04-08 NOTE — ED ADULT NURSE NOTE - ED STAT RN HANDOFF TIME
----- Message from JULI Fajardo sent at 4/8/2024  8:15 AM CDT -----  Iron level remains mildly low, but is stable. Ferritin (stored iron) is normal. Anemia has improved slightly. Continue iron supplement and see Dr Beth this week as scheduled.     
Left message for patient to return call to the clinic.  
Patient returned call. Clinical staff busy. Please return call.  
Spoke with patient regarding results below. Patient verbalized understanding, no further questions asked at this time.   
17:00

## 2025-07-25 NOTE — ED ADULT NURSE NOTE - PRIMARY CARE PROVIDER
Tin Llanos (:  1956) is a 69 y.o. male,New patient, here for evaluation of the following chief complaint(s):  New Patient         Assessment & Plan  Type 2 diabetes mellitus treated without insulin (HCC)       Orders:  •  POCT Glucose  •  POCT glycosylated hemoglobin (Hb A1C)  •  Albumin/Creatinine Ratio, Urine; Future  •  metFORMIN (GLUCOPHAGE-XR) 500 MG extended release tablet; Take 2 tablets by mouth daily (with breakfast)  •  HM DIABETES FOOT EXAM    Essential hypertension            Pure hypercholesterolemia            Tobacco abuse       Orders:  •  CT LUNG CANCER SCREENING (INITIAL/ANNUAL); Future    Simple chronic bronchitis (HCC)       Orders:  •  Budeson-Glycopyrrol-Formoterol (BREZTRI AEROSPHERE) 160-9-4.8 MCG/ACT AERO; Inhale 2 Inhalations into the lungs in the morning and at bedtime    Screening for malignant neoplasm of prostate       Orders:  •  PSA Screening; Future      Work at reducing the bread, potatoes noodles and sweets.  Try to stop smoking.  Do the blood and urine test soon.  See me in a month           Subjective   HPI  He was told he was DM in the past but is not on meds now.  He sees a vascular surgeon and a cardiologist.  He had seen the pulmonologist in the past and is overdue for the screening CT of the chest.  He had anoro in the past and I suggested he try some breztri  Review of Systems   Constitutional:  Negative for fatigue.   HENT:  Negative for sinus pressure.    Eyes:  Negative for visual disturbance.   Respiratory:  Negative for shortness of breath.    Cardiovascular:  Negative for chest pain.   Gastrointestinal:  Negative for constipation.   Genitourinary: Negative.    Musculoskeletal:  Negative for arthralgias.   Skin:  Negative for rash.   Neurological:  Negative for headaches.   The patient's medications, allergies, past medical problems, surgical, social, and family histories were reviewed and updated as needed.         Objective   Physical 
unable to obtain